# Patient Record
Sex: FEMALE | Race: WHITE | Employment: OTHER | URBAN - METROPOLITAN AREA
[De-identification: names, ages, dates, MRNs, and addresses within clinical notes are randomized per-mention and may not be internally consistent; named-entity substitution may affect disease eponyms.]

---

## 2017-07-14 ENCOUNTER — HOSPITAL ENCOUNTER (OUTPATIENT)
Dept: CT IMAGING | Age: 67
Discharge: HOME | End: 2017-07-14
Attending: ORTHOPAEDIC SURGERY
Payer: MEDICARE

## 2017-07-14 DIAGNOSIS — M25.561 RIGHT KNEE PAIN: ICD-10-CM

## 2017-07-14 PROCEDURE — 73700 CT LOWER EXTREMITY W/O DYE: CPT

## 2017-08-28 ENCOUNTER — HOSPITAL ENCOUNTER (OUTPATIENT)
Dept: PREADMISSION TESTING | Age: 67
Discharge: HOME | End: 2017-08-28
Payer: MEDICARE

## 2017-08-28 VITALS — BODY MASS INDEX: 23.95 KG/M2 | HEIGHT: 68 IN | WEIGHT: 158 LBS

## 2017-08-28 LAB
ANION GAP SERPL CALC-SCNC: 6 MMOL/L (ref 3–18)
APPEARANCE UR: CLEAR
ATRIAL RATE: 70 BPM
BACTERIA SPEC CULT: NORMAL
BILIRUB UR QL: NEGATIVE
BUN SERPL-MCNC: 13 MG/DL (ref 7–18)
BUN/CREAT SERPL: 19 (ref 12–20)
CALCIUM SERPL-MCNC: 9.1 MG/DL (ref 8.5–10.1)
CALCULATED P AXIS, ECG09: 69 DEGREES
CALCULATED R AXIS, ECG10: 59 DEGREES
CALCULATED T AXIS, ECG11: 67 DEGREES
CHLORIDE SERPL-SCNC: 103 MMOL/L (ref 100–108)
CO2 SERPL-SCNC: 31 MMOL/L (ref 21–32)
COLOR UR: YELLOW
CREAT SERPL-MCNC: 0.7 MG/DL (ref 0.6–1.3)
DIAGNOSIS, 93000: NORMAL
ERYTHROCYTE [DISTWIDTH] IN BLOOD BY AUTOMATED COUNT: 13 % (ref 11.6–14.5)
GLUCOSE SERPL-MCNC: 92 MG/DL (ref 74–99)
GLUCOSE UR STRIP.AUTO-MCNC: NEGATIVE MG/DL
HCT VFR BLD AUTO: 41 % (ref 35–45)
HGB BLD-MCNC: 14.1 G/DL (ref 12–16)
HGB UR QL STRIP: NEGATIVE
KETONES UR QL STRIP.AUTO: NEGATIVE MG/DL
LEUKOCYTE ESTERASE UR QL STRIP.AUTO: NEGATIVE
MCH RBC QN AUTO: 31.3 PG (ref 24–34)
MCHC RBC AUTO-ENTMCNC: 34.4 G/DL (ref 31–37)
MCV RBC AUTO: 91.1 FL (ref 74–97)
NITRITE UR QL STRIP.AUTO: NEGATIVE
P-R INTERVAL, ECG05: 162 MS
PH UR STRIP: 6.5 [PH] (ref 5–8)
PLATELET # BLD AUTO: 268 K/UL (ref 135–420)
PMV BLD AUTO: 9.6 FL (ref 9.2–11.8)
POTASSIUM SERPL-SCNC: 3.7 MMOL/L (ref 3.5–5.5)
PROT UR STRIP-MCNC: NEGATIVE MG/DL
Q-T INTERVAL, ECG07: 414 MS
QRS DURATION, ECG06: 88 MS
QTC CALCULATION (BEZET), ECG08: 447 MS
RBC # BLD AUTO: 4.5 M/UL (ref 4.2–5.3)
SERVICE CMNT-IMP: NORMAL
SODIUM SERPL-SCNC: 140 MMOL/L (ref 136–145)
SP GR UR REFRACTOMETRY: 1.02 (ref 1–1.03)
UROBILINOGEN UR QL STRIP.AUTO: 1 EU/DL (ref 0.2–1)
VENTRICULAR RATE, ECG03: 70 BPM
WBC # BLD AUTO: 7 K/UL (ref 4.6–13.2)

## 2017-08-28 PROCEDURE — 36415 COLL VENOUS BLD VENIPUNCTURE: CPT | Performed by: ORTHOPAEDIC SURGERY

## 2017-08-28 PROCEDURE — 81003 URINALYSIS AUTO W/O SCOPE: CPT | Performed by: ORTHOPAEDIC SURGERY

## 2017-08-28 PROCEDURE — 93005 ELECTROCARDIOGRAM TRACING: CPT

## 2017-08-28 PROCEDURE — 80048 BASIC METABOLIC PNL TOTAL CA: CPT | Performed by: ORTHOPAEDIC SURGERY

## 2017-08-28 PROCEDURE — 87086 URINE CULTURE/COLONY COUNT: CPT | Performed by: ORTHOPAEDIC SURGERY

## 2017-08-28 PROCEDURE — 87641 MR-STAPH DNA AMP PROBE: CPT | Performed by: ORTHOPAEDIC SURGERY

## 2017-08-28 PROCEDURE — 85027 COMPLETE CBC AUTOMATED: CPT | Performed by: ORTHOPAEDIC SURGERY

## 2017-08-28 RX ORDER — HYDROCHLOROTHIAZIDE 25 MG/1
25 TABLET ORAL DAILY
COMMUNITY

## 2017-08-28 RX ORDER — AMLODIPINE BESYLATE 5 MG/1
5 TABLET ORAL DAILY
COMMUNITY

## 2017-08-28 RX ORDER — LEVOTHYROXINE SODIUM 112 UG/1
112 TABLET ORAL
COMMUNITY

## 2017-08-28 RX ORDER — MELATONIN
1000 DAILY
COMMUNITY

## 2017-08-28 RX ORDER — GLUCOSAMINE/CHONDR SU A SOD 750-600 MG
1 TABLET ORAL 2 TIMES DAILY
COMMUNITY
End: 2017-09-14

## 2017-08-28 RX ORDER — CETIRIZINE HCL 10 MG
10 TABLET ORAL DAILY
COMMUNITY

## 2017-08-28 RX ORDER — NIACINAMIDE 500 MG
500 TABLET ORAL 2 TIMES DAILY
COMMUNITY

## 2017-08-28 RX ORDER — PRAVASTATIN SODIUM 40 MG/1
40 TABLET ORAL
COMMUNITY

## 2017-08-28 NOTE — PERIOP NOTES
Patient is allergic to PCN and Clindamycin.   Dr Anny Forte office called, left message on Victorina's voicemail re: alternative antibiotic

## 2017-08-30 LAB
BACTERIA SPEC CULT: NORMAL
SERVICE CMNT-IMP: NORMAL

## 2017-09-08 PROBLEM — E03.9 ACQUIRED HYPOTHYROIDISM: Chronic | Status: ACTIVE | Noted: 2017-09-08

## 2017-09-08 PROBLEM — I10 HTN (HYPERTENSION): Chronic | Status: ACTIVE | Noted: 2017-09-08

## 2017-09-08 PROBLEM — M17.11 PRIMARY OSTEOARTHRITIS OF RIGHT KNEE: Chronic | Status: ACTIVE | Noted: 2017-09-08

## 2017-09-08 PROBLEM — K21.9 GERD (GASTROESOPHAGEAL REFLUX DISEASE): Chronic | Status: ACTIVE | Noted: 2017-09-08

## 2017-09-08 PROBLEM — E78.00 HIGH CHOLESTEROL: Chronic | Status: ACTIVE | Noted: 2017-09-08

## 2017-09-08 NOTE — H&P
History and Physical        Patient: Arnie Zarate               Sex: female          DOA: (Not on file)         YOB: 1950      Age:  78 y.o.        LOS:  LOS: 0 days        HPI:     Marvel Burns is in for followup of her right severe tricompartmental knee DJD. The patient has not been in to see Dr. Shira Méndez in about a year. She has had two other cortisone injections by Dr. Vinita Kirk, but they have been less successful over the last two times. She is now to the point where it bothers her with all of her ADLs, and she wants to proceed with a right TKA. Dr. Shira Méndez talked to her about the things as we have in the past.    Standing AP, tunnel, lateral, and sunrise views of the right knee were obtained and interpreted in the office and show severe bicompartmental knee DJD that is bone-on-bone with mild valgus deformity. Otherwise, there is no periosteal reaction, no medullary lesions, no osteopenia, and no chondrolysis. Past Medical History:   Diagnosis Date    Arthritis     GERD (gastroesophageal reflux disease)     Hypertension     Thyroid disease        Past Surgical History:   Procedure Laterality Date    HX ACL RECONSTRUCTION Right     HX  SECTION      HX KNEE ARTHROSCOPY Right     x 3    HX LAP CHOLECYSTECTOMY      HX SHOULDER ARTHROSCOPY Right        No family history on file. Social History     Social History    Marital status:      Spouse name: N/A    Number of children: N/A    Years of education: N/A     Social History Main Topics    Smoking status: Former Smoker     Quit date: 1973    Smokeless tobacco: Never Used    Alcohol use Yes      Comment: 2-3 glasses wine weekly    Drug use: No    Sexual activity: Yes     Other Topics Concern    Not on file     Social History Narrative    No narrative on file       Prior to Admission medications    Medication Sig Start Date End Date Taking?  Authorizing Provider   amLODIPine (NORVASC) 5 mg tablet Take 5 mg by mouth daily. Historical Provider   hydroCHLOROthiazide (HYDRODIURIL) 25 mg tablet Take 25 mg by mouth daily. Historical Provider   pravastatin (PRAVACHOL) 40 mg tablet Take 40 mg by mouth daily (after dinner). Historical Provider   levothyroxine (SYNTHROID) 112 mcg tablet Take 112 mcg by mouth nightly. Historical Provider   cetirizine (ZYRTEC) 10 mg tablet Take 10 mg by mouth daily. Historical Provider   MV-MN/FOLIC ACID/CALCIUM/VIT K (ONE-A-DAY WOMEN'S 50 PLUS PO) Take 1 Tab by mouth daily. Historical Provider   CALCIUM CARBONATE/VITAMIN D3 (CALCIUM 600 + D,3, PO) Take 1 Tab by mouth daily. Historical Provider   cholecalciferol (VITAMIN D3) 1,000 unit tablet Take 1,000 Units by mouth daily. Historical Provider   GLUCOSAMINE/CHONDR TOUSSAINT A SOD (GLUCOSAMINE-CHONDROITIN) 750-600 mg tab Take 1 Tab by mouth two (2) times a day. Historical Provider   niacinamide 500 mg tablet Take 500 mg by mouth two (2) times a day. Historical Provider       Allergies   Allergen Reactions    Pcn [Penicillins] Hives, Rash, Itching and Swelling     Swelling of face and ears      Adhesive Tape-Silicones Rash and Itching    Clindamycin Hives, Rash and Itching       Review of Systems    Pertinent positives include joint pain. Pertinent negatives include weight change and loss of balance. Physical Exam:      There were no vitals taken for this visit. Physical Exam:  Physical exam shows a healthy-appearing, middle-aged white female. The right knee has about -10 to flexion of 130 degrees with mild genu valgum. She has positive patellofemoral crepitation and pain on the medial and lateral joint lines. Otherwise, examination of the right knee demonstrates the patient has a negative Lachman and has no varus instability at zero degrees or 30 degrees. There is a negative posterior sag. There is no knee effusion. There is no swelling, ecchymosis, or wounds. The patient has no no popliteal pain.   The patient has a negative patellar inhibition, a negative patellar grind, and a negative patellar apprehension test.  There is a negative Teofilo Maneuver. There is no pain at the inferior pole of the patella or the tibial tubercle. The patient has 5/5 muscle strength with good quadriceps tone. The patient has 2+ pedal pulses with normal motor strength of the foot and ankle and normal light-touch sensation in the foot and lower leg. Assessment/Plan     Principal Problem:    Primary osteoarthritis of right knee (9/8/2017)    Active Problems:    GERD (gastroesophageal reflux disease) (9/8/2017)      HTN (hypertension) (9/8/2017)      Acquired hypothyroidism (9/8/2017)      High cholesterol (9/8/2017)      Dr. Cyndi Rodriguez discussed with her a right TKA. We have ordered an MRI ahead of time for a ConforMIS TKA. He talked to her about the risks, the alternatives, and the benefits including infection, pain, and bleeding. We are going to do this in the middle of September so that she can be ready to go to her [facility] home in November. He told her we will use aspirin for postoperative DVT prophylaxis. She will be in the hospital overnight to Dr. Cyndi Rodriguez' home protocol after that. He talked to her about the importance of ice, elevation, range of motion, and postoperative physical therapy. He will see her again two weeks postop.

## 2017-09-12 ENCOUNTER — ANESTHESIA EVENT (OUTPATIENT)
Dept: SURGERY | Age: 67
DRG: 470 | End: 2017-09-12
Payer: MEDICARE

## 2017-09-12 RX ORDER — SODIUM CHLORIDE, SODIUM LACTATE, POTASSIUM CHLORIDE, CALCIUM CHLORIDE 600; 310; 30; 20 MG/100ML; MG/100ML; MG/100ML; MG/100ML
125 INJECTION, SOLUTION INTRAVENOUS CONTINUOUS
Status: CANCELLED | OUTPATIENT
Start: 2017-09-12 | End: 2017-09-13

## 2017-09-12 RX ORDER — CEFAZOLIN SODIUM 2 G/50ML
2 SOLUTION INTRAVENOUS EVERY 8 HOURS
Status: CANCELLED | OUTPATIENT
Start: 2017-09-12 | End: 2017-09-12

## 2017-09-12 RX ORDER — ONDANSETRON 4 MG/1
4 TABLET, ORALLY DISINTEGRATING ORAL
Status: CANCELLED | OUTPATIENT
Start: 2017-09-12

## 2017-09-12 RX ORDER — SODIUM CHLORIDE 0.9 % (FLUSH) 0.9 %
5-10 SYRINGE (ML) INJECTION EVERY 8 HOURS
Status: CANCELLED | OUTPATIENT
Start: 2017-09-12

## 2017-09-12 RX ORDER — SODIUM CHLORIDE 0.9 % (FLUSH) 0.9 %
5-10 SYRINGE (ML) INJECTION AS NEEDED
Status: CANCELLED | OUTPATIENT
Start: 2017-09-12

## 2017-09-12 RX ORDER — SODIUM CHLORIDE, SODIUM LACTATE, POTASSIUM CHLORIDE, CALCIUM CHLORIDE 600; 310; 30; 20 MG/100ML; MG/100ML; MG/100ML; MG/100ML
75 INJECTION, SOLUTION INTRAVENOUS CONTINUOUS
Status: CANCELLED | OUTPATIENT
Start: 2017-09-12 | End: 2017-09-13

## 2017-09-12 RX ORDER — CEFAZOLIN SODIUM 2 G/50ML
2 SOLUTION INTRAVENOUS ONCE
Status: CANCELLED | OUTPATIENT
Start: 2017-09-12 | End: 2017-09-12

## 2017-09-12 RX ORDER — DIPHENHYDRAMINE HCL 25 MG
25 CAPSULE ORAL
Status: CANCELLED | OUTPATIENT
Start: 2017-09-12

## 2017-09-12 RX ORDER — OXYCODONE HYDROCHLORIDE 5 MG/1
5-10 TABLET ORAL
Status: CANCELLED | OUTPATIENT
Start: 2017-09-12

## 2017-09-12 RX ORDER — ACETAMINOPHEN 325 MG/1
650 TABLET ORAL
Status: CANCELLED | OUTPATIENT
Start: 2017-09-12

## 2017-09-12 RX ORDER — NALOXONE HYDROCHLORIDE 0.4 MG/ML
0.4 INJECTION, SOLUTION INTRAMUSCULAR; INTRAVENOUS; SUBCUTANEOUS AS NEEDED
Status: CANCELLED | OUTPATIENT
Start: 2017-09-12

## 2017-09-12 RX ORDER — ZOLPIDEM TARTRATE 5 MG/1
5 TABLET ORAL
Status: CANCELLED | OUTPATIENT
Start: 2017-09-12

## 2017-09-12 RX ORDER — BISACODYL 5 MG
5 TABLET, DELAYED RELEASE (ENTERIC COATED) ORAL DAILY PRN
Status: CANCELLED | OUTPATIENT
Start: 2017-09-12

## 2017-09-13 ENCOUNTER — ANESTHESIA (OUTPATIENT)
Dept: SURGERY | Age: 67
DRG: 470 | End: 2017-09-13
Payer: MEDICARE

## 2017-09-13 ENCOUNTER — HOSPITAL ENCOUNTER (INPATIENT)
Age: 67
LOS: 1 days | Discharge: HOME HEALTH | DRG: 470 | End: 2017-09-14
Attending: ORTHOPAEDIC SURGERY | Admitting: ORTHOPAEDIC SURGERY
Payer: MEDICARE

## 2017-09-13 PROBLEM — M17.11 RIGHT KNEE DJD: Status: ACTIVE | Noted: 2017-09-13

## 2017-09-13 LAB
ABO + RH BLD: NORMAL
BLOOD GROUP ANTIBODIES SERPL: NORMAL
SPECIMEN EXP DATE BLD: NORMAL

## 2017-09-13 PROCEDURE — 77030006643: Performed by: SPECIALIST

## 2017-09-13 PROCEDURE — 77030038011: Performed by: ORTHOPAEDIC SURGERY

## 2017-09-13 PROCEDURE — L1830 KO IMMOB CANVAS LONG PRE OTS: HCPCS | Performed by: ORTHOPAEDIC SURGERY

## 2017-09-13 PROCEDURE — 74011250636 HC RX REV CODE- 250/636: Performed by: PHYSICIAN ASSISTANT

## 2017-09-13 PROCEDURE — 74011250636 HC RX REV CODE- 250/636: Performed by: SPECIALIST

## 2017-09-13 PROCEDURE — 76060000035 HC ANESTHESIA 2 TO 2.5 HR: Performed by: ORTHOPAEDIC SURGERY

## 2017-09-13 PROCEDURE — 74011250636 HC RX REV CODE- 250/636

## 2017-09-13 PROCEDURE — 77030016060 HC NDL NRV BLK TELE -A: Performed by: SPECIALIST

## 2017-09-13 PROCEDURE — 77030008477 HC STYL SATN SLP COVD -A: Performed by: SPECIALIST

## 2017-09-13 PROCEDURE — 76942 ECHO GUIDE FOR BIOPSY: CPT | Performed by: ORTHOPAEDIC SURGERY

## 2017-09-13 PROCEDURE — 36415 COLL VENOUS BLD VENIPUNCTURE: CPT | Performed by: ORTHOPAEDIC SURGERY

## 2017-09-13 PROCEDURE — 74011250637 HC RX REV CODE- 250/637: Performed by: SPECIALIST

## 2017-09-13 PROCEDURE — 74011000258 HC RX REV CODE- 258: Performed by: ORTHOPAEDIC SURGERY

## 2017-09-13 PROCEDURE — 76210000016 HC OR PH I REC 1 TO 1.5 HR: Performed by: ORTHOPAEDIC SURGERY

## 2017-09-13 PROCEDURE — C9290 INJ, BUPIVACAINE LIPOSOME: HCPCS | Performed by: ORTHOPAEDIC SURGERY

## 2017-09-13 PROCEDURE — 77030018836 HC SOL IRR NACL ICUM -A: Performed by: ORTHOPAEDIC SURGERY

## 2017-09-13 PROCEDURE — 77030008683 HC TU ET CUF COVD -A: Performed by: SPECIALIST

## 2017-09-13 PROCEDURE — 77030002934 HC SUT MCRYL J&J -B: Performed by: ORTHOPAEDIC SURGERY

## 2017-09-13 PROCEDURE — 74011250637 HC RX REV CODE- 250/637: Performed by: PHYSICIAN ASSISTANT

## 2017-09-13 PROCEDURE — 65270000029 HC RM PRIVATE

## 2017-09-13 PROCEDURE — 74011000250 HC RX REV CODE- 250: Performed by: SPECIALIST

## 2017-09-13 PROCEDURE — 77030011640 HC PAD GRND REM COVD -A: Performed by: ORTHOPAEDIC SURGERY

## 2017-09-13 PROCEDURE — 77030036563 HC WRP CLD THER KNE S2SG -B: Performed by: ORTHOPAEDIC SURGERY

## 2017-09-13 PROCEDURE — 74011000250 HC RX REV CODE- 250

## 2017-09-13 PROCEDURE — 77030034479 HC ADH SKN CLSR PRINEO J&J -B: Performed by: ORTHOPAEDIC SURGERY

## 2017-09-13 PROCEDURE — 77030027138 HC INCENT SPIROMETER -A: Performed by: ORTHOPAEDIC SURGERY

## 2017-09-13 PROCEDURE — 77030018846 HC SOL IRR STRL H20 ICUM -A: Performed by: ORTHOPAEDIC SURGERY

## 2017-09-13 PROCEDURE — 77030037875 HC DRSG MEPILEX <16IN BORD MOLN -A: Performed by: ORTHOPAEDIC SURGERY

## 2017-09-13 PROCEDURE — 77030018822 HC SLV COMPR FT COVD -B: Performed by: ORTHOPAEDIC SURGERY

## 2017-09-13 PROCEDURE — 64415 NJX AA&/STRD BRCH PLXS IMG: CPT | Performed by: SPECIALIST

## 2017-09-13 PROCEDURE — 0SRC0J9 REPLACEMENT OF RIGHT KNEE JOINT WITH SYNTHETIC SUBSTITUTE, CEMENTED, OPEN APPROACH: ICD-10-PCS | Performed by: ORTHOPAEDIC SURGERY

## 2017-09-13 PROCEDURE — 86900 BLOOD TYPING SEROLOGIC ABO: CPT | Performed by: ORTHOPAEDIC SURGERY

## 2017-09-13 PROCEDURE — 74011000250 HC RX REV CODE- 250: Performed by: ORTHOPAEDIC SURGERY

## 2017-09-13 PROCEDURE — 77030031139 HC SUT VCRL2 J&J -A: Performed by: ORTHOPAEDIC SURGERY

## 2017-09-13 PROCEDURE — C1713 ANCHOR/SCREW BN/BN,TIS/BN: HCPCS | Performed by: ORTHOPAEDIC SURGERY

## 2017-09-13 PROCEDURE — C1776 JOINT DEVICE (IMPLANTABLE): HCPCS | Performed by: ORTHOPAEDIC SURGERY

## 2017-09-13 PROCEDURE — 97161 PT EVAL LOW COMPLEX 20 MIN: CPT

## 2017-09-13 PROCEDURE — 74011250636 HC RX REV CODE- 250/636: Performed by: ORTHOPAEDIC SURGERY

## 2017-09-13 PROCEDURE — 77030003666 HC NDL SPINAL BD -A: Performed by: ORTHOPAEDIC SURGERY

## 2017-09-13 PROCEDURE — 77030020782 HC GWN BAIR PAWS FLX 3M -B: Performed by: ORTHOPAEDIC SURGERY

## 2017-09-13 PROCEDURE — 77030038010: Performed by: ORTHOPAEDIC SURGERY

## 2017-09-13 PROCEDURE — 77030013708 HC HNDPC SUC IRR PULS STRY –B: Performed by: ORTHOPAEDIC SURGERY

## 2017-09-13 PROCEDURE — 77030020754 HC CUF TRNQT 2BLA STRY -B: Performed by: ORTHOPAEDIC SURGERY

## 2017-09-13 PROCEDURE — 97116 GAIT TRAINING THERAPY: CPT

## 2017-09-13 PROCEDURE — 76010000131 HC OR TIME 2 TO 2.5 HR: Performed by: ORTHOPAEDIC SURGERY

## 2017-09-13 DEVICE — CEMENT BNE 20GM HALF DOSE PMMA VISC RADPQ FAST: Type: IMPLANTABLE DEVICE | Site: KNEE | Status: FUNCTIONAL

## 2017-09-13 DEVICE — IMPLANTABLE DEVICE: Type: IMPLANTABLE DEVICE | Site: KNEE | Status: FUNCTIONAL

## 2017-09-13 RX ORDER — ACETAMINOPHEN 10 MG/ML
1000 INJECTION, SOLUTION INTRAVENOUS
Status: COMPLETED | OUTPATIENT
Start: 2017-09-13 | End: 2017-09-13

## 2017-09-13 RX ORDER — SODIUM CHLORIDE, SODIUM LACTATE, POTASSIUM CHLORIDE, CALCIUM CHLORIDE 600; 310; 30; 20 MG/100ML; MG/100ML; MG/100ML; MG/100ML
125 INJECTION, SOLUTION INTRAVENOUS CONTINUOUS
Status: DISCONTINUED | OUTPATIENT
Start: 2017-09-13 | End: 2017-09-13 | Stop reason: HOSPADM

## 2017-09-13 RX ORDER — CELECOXIB 100 MG/1
200 CAPSULE ORAL ONCE
Status: COMPLETED | OUTPATIENT
Start: 2017-09-13 | End: 2017-09-13

## 2017-09-13 RX ORDER — HYDROCHLOROTHIAZIDE 25 MG/1
25 TABLET ORAL DAILY
Status: DISCONTINUED | OUTPATIENT
Start: 2017-09-14 | End: 2017-09-14 | Stop reason: HOSPADM

## 2017-09-13 RX ORDER — PREGABALIN 25 MG/1
25 CAPSULE ORAL ONCE
Status: COMPLETED | OUTPATIENT
Start: 2017-09-13 | End: 2017-09-13

## 2017-09-13 RX ORDER — ROPIVACAINE HYDROCHLORIDE 5 MG/ML
INJECTION, SOLUTION EPIDURAL; INFILTRATION; PERINEURAL AS NEEDED
Status: DISCONTINUED | OUTPATIENT
Start: 2017-09-13 | End: 2017-09-13 | Stop reason: HOSPADM

## 2017-09-13 RX ORDER — PRAVASTATIN SODIUM 20 MG/1
40 TABLET ORAL
Status: DISCONTINUED | OUTPATIENT
Start: 2017-09-13 | End: 2017-09-14 | Stop reason: HOSPADM

## 2017-09-13 RX ORDER — OMEPRAZOLE 40 MG/1
40 CAPSULE, DELAYED RELEASE ORAL
COMMUNITY

## 2017-09-13 RX ORDER — ROCURONIUM BROMIDE 10 MG/ML
INJECTION, SOLUTION INTRAVENOUS AS NEEDED
Status: DISCONTINUED | OUTPATIENT
Start: 2017-09-13 | End: 2017-09-13 | Stop reason: HOSPADM

## 2017-09-13 RX ORDER — KETAMINE HYDROCHLORIDE 10 MG/ML
INJECTION, SOLUTION INTRAMUSCULAR; INTRAVENOUS AS NEEDED
Status: DISCONTINUED | OUTPATIENT
Start: 2017-09-13 | End: 2017-09-13 | Stop reason: HOSPADM

## 2017-09-13 RX ORDER — AMLODIPINE BESYLATE 5 MG/1
5 TABLET ORAL DAILY
Status: DISCONTINUED | OUTPATIENT
Start: 2017-09-14 | End: 2017-09-14 | Stop reason: HOSPADM

## 2017-09-13 RX ORDER — MELATONIN
1000 DAILY
Status: DISCONTINUED | OUTPATIENT
Start: 2017-09-14 | End: 2017-09-14 | Stop reason: HOSPADM

## 2017-09-13 RX ORDER — DIPHENHYDRAMINE HCL 25 MG
25 CAPSULE ORAL
Status: DISCONTINUED | OUTPATIENT
Start: 2017-09-13 | End: 2017-09-14 | Stop reason: HOSPADM

## 2017-09-13 RX ORDER — ONDANSETRON 2 MG/ML
INJECTION INTRAMUSCULAR; INTRAVENOUS AS NEEDED
Status: DISCONTINUED | OUTPATIENT
Start: 2017-09-13 | End: 2017-09-13 | Stop reason: HOSPADM

## 2017-09-13 RX ORDER — VANCOMYCIN HYDROCHLORIDE
1250 EVERY 12 HOURS
Status: COMPLETED | OUTPATIENT
Start: 2017-09-14 | End: 2017-09-14

## 2017-09-13 RX ORDER — BISACODYL 5 MG
5 TABLET, DELAYED RELEASE (ENTERIC COATED) ORAL DAILY PRN
Status: DISCONTINUED | OUTPATIENT
Start: 2017-09-13 | End: 2017-09-14 | Stop reason: HOSPADM

## 2017-09-13 RX ORDER — FAMOTIDINE 10 MG/ML
20 INJECTION INTRAVENOUS ONCE
Status: COMPLETED | OUTPATIENT
Start: 2017-09-13 | End: 2017-09-13

## 2017-09-13 RX ORDER — LORATADINE 10 MG/1
10 TABLET ORAL DAILY
Status: DISCONTINUED | OUTPATIENT
Start: 2017-09-14 | End: 2017-09-14 | Stop reason: HOSPADM

## 2017-09-13 RX ORDER — NALOXONE HYDROCHLORIDE 0.4 MG/ML
0.4 INJECTION, SOLUTION INTRAMUSCULAR; INTRAVENOUS; SUBCUTANEOUS AS NEEDED
Status: DISCONTINUED | OUTPATIENT
Start: 2017-09-13 | End: 2017-09-14 | Stop reason: HOSPADM

## 2017-09-13 RX ORDER — SODIUM CHLORIDE 0.9 % (FLUSH) 0.9 %
5-10 SYRINGE (ML) INJECTION AS NEEDED
Status: DISCONTINUED | OUTPATIENT
Start: 2017-09-13 | End: 2017-09-14 | Stop reason: HOSPADM

## 2017-09-13 RX ORDER — VANCOMYCIN HYDROCHLORIDE
1250 ONCE
Status: COMPLETED | OUTPATIENT
Start: 2017-09-13 | End: 2017-09-13

## 2017-09-13 RX ORDER — OXYCODONE HYDROCHLORIDE 5 MG/1
5 TABLET ORAL ONCE
Status: COMPLETED | OUTPATIENT
Start: 2017-09-13 | End: 2017-09-13

## 2017-09-13 RX ORDER — ACETAMINOPHEN 325 MG/1
650 TABLET ORAL
Status: DISCONTINUED | OUTPATIENT
Start: 2017-09-13 | End: 2017-09-14 | Stop reason: HOSPADM

## 2017-09-13 RX ORDER — GLYCOPYRROLATE 0.2 MG/ML
INJECTION INTRAMUSCULAR; INTRAVENOUS AS NEEDED
Status: DISCONTINUED | OUTPATIENT
Start: 2017-09-13 | End: 2017-09-13 | Stop reason: HOSPADM

## 2017-09-13 RX ORDER — SODIUM CHLORIDE, SODIUM LACTATE, POTASSIUM CHLORIDE, CALCIUM CHLORIDE 600; 310; 30; 20 MG/100ML; MG/100ML; MG/100ML; MG/100ML
75 INJECTION, SOLUTION INTRAVENOUS CONTINUOUS
Status: DISCONTINUED | OUTPATIENT
Start: 2017-09-13 | End: 2017-09-14 | Stop reason: HOSPADM

## 2017-09-13 RX ORDER — ASPIRIN 325 MG
325 TABLET ORAL 2 TIMES DAILY
Status: DISCONTINUED | OUTPATIENT
Start: 2017-09-13 | End: 2017-09-14 | Stop reason: HOSPADM

## 2017-09-13 RX ORDER — METOCLOPRAMIDE HYDROCHLORIDE 5 MG/ML
INJECTION INTRAMUSCULAR; INTRAVENOUS AS NEEDED
Status: DISCONTINUED | OUTPATIENT
Start: 2017-09-13 | End: 2017-09-13 | Stop reason: HOSPADM

## 2017-09-13 RX ORDER — LIDOCAINE HYDROCHLORIDE AND EPINEPHRINE 20; 5 MG/ML; UG/ML
INJECTION, SOLUTION EPIDURAL; INFILTRATION; INTRACAUDAL; PERINEURAL AS NEEDED
Status: DISCONTINUED | OUTPATIENT
Start: 2017-09-13 | End: 2017-09-13 | Stop reason: HOSPADM

## 2017-09-13 RX ORDER — OMEPRAZOLE 20 MG/1
40 CAPSULE, DELAYED RELEASE ORAL
Status: DISCONTINUED | OUTPATIENT
Start: 2017-09-13 | End: 2017-09-14 | Stop reason: HOSPADM

## 2017-09-13 RX ORDER — FENTANYL CITRATE 50 UG/ML
INJECTION, SOLUTION INTRAMUSCULAR; INTRAVENOUS AS NEEDED
Status: DISCONTINUED | OUTPATIENT
Start: 2017-09-13 | End: 2017-09-13 | Stop reason: HOSPADM

## 2017-09-13 RX ORDER — OXYCODONE HYDROCHLORIDE 5 MG/1
5-10 TABLET ORAL
Status: DISCONTINUED | OUTPATIENT
Start: 2017-09-13 | End: 2017-09-14 | Stop reason: HOSPADM

## 2017-09-13 RX ORDER — SODIUM CHLORIDE 0.9 % (FLUSH) 0.9 %
5-10 SYRINGE (ML) INJECTION EVERY 8 HOURS
Status: DISCONTINUED | OUTPATIENT
Start: 2017-09-13 | End: 2017-09-14 | Stop reason: HOSPADM

## 2017-09-13 RX ORDER — LEVOTHYROXINE SODIUM 112 UG/1
112 TABLET ORAL
Status: DISCONTINUED | OUTPATIENT
Start: 2017-09-13 | End: 2017-09-14 | Stop reason: HOSPADM

## 2017-09-13 RX ORDER — PROPOFOL 10 MG/ML
INJECTION, EMULSION INTRAVENOUS AS NEEDED
Status: DISCONTINUED | OUTPATIENT
Start: 2017-09-13 | End: 2017-09-13 | Stop reason: HOSPADM

## 2017-09-13 RX ORDER — MIDAZOLAM HYDROCHLORIDE 1 MG/ML
INJECTION, SOLUTION INTRAMUSCULAR; INTRAVENOUS AS NEEDED
Status: DISCONTINUED | OUTPATIENT
Start: 2017-09-13 | End: 2017-09-13 | Stop reason: HOSPADM

## 2017-09-13 RX ORDER — HYDROMORPHONE HYDROCHLORIDE 1 MG/ML
0.5 INJECTION, SOLUTION INTRAMUSCULAR; INTRAVENOUS; SUBCUTANEOUS
Status: DISCONTINUED | OUTPATIENT
Start: 2017-09-13 | End: 2017-09-13 | Stop reason: HOSPADM

## 2017-09-13 RX ORDER — FERROUS SULFATE, DRIED 160(50) MG
1 TABLET, EXTENDED RELEASE ORAL
Status: DISCONTINUED | OUTPATIENT
Start: 2017-09-14 | End: 2017-09-14 | Stop reason: HOSPADM

## 2017-09-13 RX ORDER — ZOLPIDEM TARTRATE 5 MG/1
5 TABLET ORAL
Status: DISCONTINUED | OUTPATIENT
Start: 2017-09-13 | End: 2017-09-14 | Stop reason: HOSPADM

## 2017-09-13 RX ORDER — ONDANSETRON 4 MG/1
4 TABLET, ORALLY DISINTEGRATING ORAL
Status: DISCONTINUED | OUTPATIENT
Start: 2017-09-13 | End: 2017-09-14 | Stop reason: HOSPADM

## 2017-09-13 RX ORDER — LIDOCAINE HYDROCHLORIDE 20 MG/ML
INJECTION, SOLUTION EPIDURAL; INFILTRATION; INTRACAUDAL; PERINEURAL AS NEEDED
Status: DISCONTINUED | OUTPATIENT
Start: 2017-09-13 | End: 2017-09-13 | Stop reason: HOSPADM

## 2017-09-13 RX ORDER — NIACINAMIDE 500 MG
500 TABLET ORAL 2 TIMES DAILY
Status: DISCONTINUED | OUTPATIENT
Start: 2017-09-13 | End: 2017-09-14 | Stop reason: HOSPADM

## 2017-09-13 RX ADMIN — ROCURONIUM BROMIDE 50 MG: 10 INJECTION, SOLUTION INTRAVENOUS at 12:08

## 2017-09-13 RX ADMIN — HYDROMORPHONE HYDROCHLORIDE 0.5 MG: 1 INJECTION, SOLUTION INTRAMUSCULAR; INTRAVENOUS; SUBCUTANEOUS at 15:15

## 2017-09-13 RX ADMIN — PROPOFOL 20 MG: 10 INJECTION, EMULSION INTRAVENOUS at 13:49

## 2017-09-13 RX ADMIN — PRAVASTATIN SODIUM 40 MG: 20 TABLET ORAL at 18:29

## 2017-09-13 RX ADMIN — HYDROMORPHONE HYDROCHLORIDE 0.5 MG: 1 INJECTION, SOLUTION INTRAMUSCULAR; INTRAVENOUS; SUBCUTANEOUS at 15:02

## 2017-09-13 RX ADMIN — FENTANYL CITRATE 25 MCG: 50 INJECTION, SOLUTION INTRAMUSCULAR; INTRAVENOUS at 14:04

## 2017-09-13 RX ADMIN — SODIUM CHLORIDE, SODIUM LACTATE, POTASSIUM CHLORIDE, AND CALCIUM CHLORIDE: 600; 310; 30; 20 INJECTION, SOLUTION INTRAVENOUS at 12:30

## 2017-09-13 RX ADMIN — PROPOFOL 20 MG: 10 INJECTION, EMULSION INTRAVENOUS at 13:52

## 2017-09-13 RX ADMIN — KETAMINE HYDROCHLORIDE 10 MG: 10 INJECTION, SOLUTION INTRAMUSCULAR; INTRAVENOUS at 12:05

## 2017-09-13 RX ADMIN — SODIUM CHLORIDE, SODIUM LACTATE, POTASSIUM CHLORIDE, AND CALCIUM CHLORIDE: 600; 310; 30; 20 INJECTION, SOLUTION INTRAVENOUS at 13:00

## 2017-09-13 RX ADMIN — ACETAMINOPHEN 1000 MG: 10 INJECTION, SOLUTION INTRAVENOUS at 14:49

## 2017-09-13 RX ADMIN — KETAMINE HYDROCHLORIDE 10 MG: 10 INJECTION, SOLUTION INTRAMUSCULAR; INTRAVENOUS at 11:30

## 2017-09-13 RX ADMIN — MIDAZOLAM HYDROCHLORIDE 1.5 MG: 1 INJECTION, SOLUTION INTRAMUSCULAR; INTRAVENOUS at 11:30

## 2017-09-13 RX ADMIN — KETAMINE HYDROCHLORIDE 10 MG: 10 INJECTION, SOLUTION INTRAMUSCULAR; INTRAVENOUS at 11:56

## 2017-09-13 RX ADMIN — FENTANYL CITRATE 50 MCG: 50 INJECTION, SOLUTION INTRAMUSCULAR; INTRAVENOUS at 12:05

## 2017-09-13 RX ADMIN — OXYCODONE HYDROCHLORIDE 5 MG: 5 TABLET ORAL at 23:36

## 2017-09-13 RX ADMIN — LEVOTHYROXINE SODIUM 112 MCG: 112 TABLET ORAL at 22:55

## 2017-09-13 RX ADMIN — LIDOCAINE HYDROCHLORIDE 100 MG: 20 INJECTION, SOLUTION EPIDURAL; INFILTRATION; INTRACAUDAL; PERINEURAL at 12:02

## 2017-09-13 RX ADMIN — FENTANYL CITRATE 25 MCG: 50 INJECTION, SOLUTION INTRAMUSCULAR; INTRAVENOUS at 14:10

## 2017-09-13 RX ADMIN — ROPIVACAINE HYDROCHLORIDE 10 ML: 5 INJECTION, SOLUTION EPIDURAL; INFILTRATION; PERINEURAL at 11:34

## 2017-09-13 RX ADMIN — SODIUM CHLORIDE, SODIUM LACTATE, POTASSIUM CHLORIDE, AND CALCIUM CHLORIDE 125 ML/HR: 600; 310; 30; 20 INJECTION, SOLUTION INTRAVENOUS at 14:52

## 2017-09-13 RX ADMIN — PROPOFOL 120 MG: 10 INJECTION, EMULSION INTRAVENOUS at 12:08

## 2017-09-13 RX ADMIN — FENTANYL CITRATE 25 MCG: 50 INJECTION, SOLUTION INTRAMUSCULAR; INTRAVENOUS at 12:15

## 2017-09-13 RX ADMIN — GLYCOPYRROLATE 0.1 MG: 0.2 INJECTION INTRAMUSCULAR; INTRAVENOUS at 11:56

## 2017-09-13 RX ADMIN — PREGABALIN 25 MG: 25 CAPSULE ORAL at 11:38

## 2017-09-13 RX ADMIN — FENTANYL CITRATE 25 MCG: 50 INJECTION, SOLUTION INTRAMUSCULAR; INTRAVENOUS at 12:02

## 2017-09-13 RX ADMIN — MIDAZOLAM HYDROCHLORIDE 2 MG: 1 INJECTION, SOLUTION INTRAMUSCULAR; INTRAVENOUS at 11:56

## 2017-09-13 RX ADMIN — PROPOFOL 20 MG: 10 INJECTION, EMULSION INTRAVENOUS at 13:55

## 2017-09-13 RX ADMIN — GLYCOPYRROLATE 0.1 MG: 0.2 INJECTION INTRAMUSCULAR; INTRAVENOUS at 12:02

## 2017-09-13 RX ADMIN — VANCOMYCIN HYDROCHLORIDE 1250 MG: 10 INJECTION, POWDER, LYOPHILIZED, FOR SOLUTION INTRAVENOUS at 11:43

## 2017-09-13 RX ADMIN — METOCLOPRAMIDE HYDROCHLORIDE 10 MG: 5 INJECTION INTRAMUSCULAR; INTRAVENOUS at 11:56

## 2017-09-13 RX ADMIN — FENTANYL CITRATE 25 MCG: 50 INJECTION, SOLUTION INTRAMUSCULAR; INTRAVENOUS at 12:10

## 2017-09-13 RX ADMIN — LIDOCAINE HYDROCHLORIDE AND EPINEPHRINE 4 ML: 20; 5 INJECTION, SOLUTION EPIDURAL; INFILTRATION; INTRACAUDAL; PERINEURAL at 11:32

## 2017-09-13 RX ADMIN — KETAMINE HYDROCHLORIDE 10 MG: 10 INJECTION, SOLUTION INTRAMUSCULAR; INTRAVENOUS at 12:02

## 2017-09-13 RX ADMIN — OXYCODONE HYDROCHLORIDE 5 MG: 5 TABLET ORAL at 11:38

## 2017-09-13 RX ADMIN — ONDANSETRON 4 MG: 2 INJECTION INTRAMUSCULAR; INTRAVENOUS at 13:45

## 2017-09-13 RX ADMIN — VANCOMYCIN HYDROCHLORIDE 1.25 G: 10 INJECTION, POWDER, LYOPHILIZED, FOR SOLUTION INTRAVENOUS at 11:40

## 2017-09-13 RX ADMIN — CELECOXIB 200 MG: 100 CAPSULE ORAL at 11:38

## 2017-09-13 RX ADMIN — FENTANYL CITRATE 25 MCG: 50 INJECTION, SOLUTION INTRAMUSCULAR; INTRAVENOUS at 11:56

## 2017-09-13 RX ADMIN — FAMOTIDINE 20 MG: 10 INJECTION, SOLUTION INTRAVENOUS at 11:40

## 2017-09-13 RX ADMIN — DIPHENHYDRAMINE HYDROCHLORIDE 25 MG: 25 CAPSULE ORAL at 22:55

## 2017-09-13 RX ADMIN — ASPIRIN 325 MG ORAL TABLET 325 MG: 325 PILL ORAL at 21:21

## 2017-09-13 RX ADMIN — SODIUM CHLORIDE, SODIUM LACTATE, POTASSIUM CHLORIDE, AND CALCIUM CHLORIDE 125 ML/HR: 600; 310; 30; 20 INJECTION, SOLUTION INTRAVENOUS at 11:15

## 2017-09-13 RX ADMIN — PROPOFOL 20 MG: 10 INJECTION, EMULSION INTRAVENOUS at 13:44

## 2017-09-13 NOTE — INTERVAL H&P NOTE
H&P Update:  Danie Whitney was seen and examined. History and physical has been reviewed. The patient has been examined. There have been no significant clinical changes since the completion of the originally dated History and Physical.  Patient identified by surgeon; surgical site was confirmed by patient and surgeon.     Signed By: Devika Estes MD     September 13, 2017 10:58 AM

## 2017-09-13 NOTE — PROGRESS NOTES
Problem: Mobility Impaired (Adult and Pediatric)  Goal: *Acute Goals and Plan of Care (Insert Text)  In 1-7 days pt will be able to perform:  ST. Bed mobility: Rolling L to R to L modified independent for positioning. 2. Supine to sit to supine S with HR for meals. 3. Sit to stand to sit S with RW in prep for ambulation. LT. Gait: Ambulate >150ft S with RW, WBAT, for home/community mobility. 2. Stair Negotiation: Ascend/descend >3 steps CGA with HR for home entry. 3. Activity tolerance: Tolerate up in chair 1-2 hours for ADLs. 4. Patient/Family Education: Patient/family to be independent with HEP for follow-up care and safe discharge. PHYSICAL THERAPY EVALUATION     Patient: Osbaldo Jason (26 y.o. female)  Date: 2017  Primary Diagnosis: OSTEOARTHRITIS RIGHT KNEE  Right knee DJD  Procedure(s) (LRB):  RIGHT TOTAL KNEE ARTHROPLASTY (CONFORMIS) (Right) Day of Surgery   Precautions:   Fall, WBAT      ASSESSMENT :  Based on the objective data described below, the patient presents with decreased functional mobility and independence in regard to bed mobility, transfers, gt quality and tolerance, R knee 5-50 degrees AROM, R knee strength, pain, stair negotiation and safety due to recent R TKA surgery. Pt denies pain at this time. Pt able to participate in gt training w/ RW, WBAT, KI, GB and CGA/min A w/ antalgic pattern. Pt c/o feeling light headed and nauseated. Pt returned to supine in bed w/ all needs within reach, SCDs applied and ice pack to R knee. Nurse Gin Vargas aware. Recommend [facility] upon hospital d/c. Patient will benefit from skilled intervention to address the above impairments.   Patients rehabilitation potential is considered to be Good  Factors which may influence rehabilitation potential include:   [ ]         None noted  [ ]         Mental ability/status  [ ]         Medical condition  [ ]         Home/family situation and support systems  [ ]         Safety awareness  [X] Pain tolerance/management  [ ]         Other:        PLAN :  Recommendations and Planned Interventions:  [X]           Bed Mobility Training             [ ]    Neuromuscular Re-Education  [X]           Transfer Training                   [ ]    Orthotic/Prosthetic Training  [X]           Gait Training                          [ ]    Modalities  [X]           Therapeutic Exercises          [ ]    Edema Management/Control  [X]           Therapeutic Activities            [X]    Patient and Family Training/Education  [ ]           Other (comment):     Frequency/Duration: Patient will be followed by physical therapy twice daily to address goals. Discharge Recommendations: Home Health  Further Equipment Recommendations for Discharge: N/A       SUBJECTIVE:   Patient stated I just feel out of it.       OBJECTIVE DATA SUMMARY:       Past Medical History:   Diagnosis Date    Arthritis      GERD (gastroesophageal reflux disease)      Hypertension      Thyroid disease       Past Surgical History:   Procedure Laterality Date    HX ACL RECONSTRUCTION Right      HX  SECTION        HX KNEE ARTHROSCOPY Right       x 3    HX LAP CHOLECYSTECTOMY        HX SHOULDER ARTHROSCOPY Right       Barriers to Learning/Limitations: None  Compensate with: visual, verbal, tactile, kinesthetic cues/model  Prior Level of Function/Home Situation:   Home Situation  Home Environment: Private residence  # Steps to Enter: 5  Rails to Enter: Yes  Hand Rails : Bilateral  One/Two Story Residence: Two story, live on 1st floor  # of Interior Steps: 13  Living Alone: No  Support Systems: Spouse/Significant Other/Partner  Patient Expects to be Discharged to[de-identified] Private residence  Current DME Used/Available at Home: Raised toilet seat, Walker, rolling  Critical Behavior:  Neurologic State: Alert; Appropriate for age  Orientation Level: Oriented X4  Cognition: Appropriate decision making; Appropriate for age attention/concentration; Appropriate safety awareness; Follows commands  Safety/Judgement: Awareness of environment  Psychosocial  Patient Behaviors: Calm; Cooperative  Purposeful Interaction: Yes  Pt Identified Daily Priority: Clinical issues (comment)  Caring Interventions: Reassure  Reassure: Therapeutic listening; Informing; Acceptance;Caring rounds  Skin Condition/Temp: Dry;Warm  Skin Integrity: Incision (comment) (R knee)  Skin Integumentary  Skin Color: Appropriate for ethnicity  Skin Condition/Temp: Dry;Warm  Skin Integrity: Incision (comment) (R knee)  Turgor: Non-tenting  Hair Growth: Present  Varicosities: Absent  Strength:    Strength: Generally decreased, functional  Tone & Sensation:   Tone: Normal  Sensation: Intact  Range Of Motion:  AROM: Generally decreased, functional (5-50 degrees R knee)  Functional Mobility:  Bed Mobility:  Supine to Sit: Contact guard assistance (vc)  Sit to Supine: Contact guard assistance (vc)  Scooting: Contact guard assistance (vc)  Transfers:  Sit to Stand: Minimum assistance (vc)  Stand to Sit: Contact guard assistance (vc)  Balance:   Sitting: Intact  Standing: Intact; With support  Ambulation/Gait Training:  Distance (ft): 34 Feet (ft)  Assistive Device: Walker, rolling;Gait belt;Brace/Splint  Ambulation - Level of Assistance: Contact guard assistance;Minimal assistance (vc)  Gait Abnormalities: Antalgic;Decreased step clearance; Step to gait  Right Side Weight Bearing: As tolerated  Base of Support: Shift to left  Stance: Right decreased  Speed/Laura: Slow  Step Length: Left shortened;Right shortened  Swing Pattern: Left asymmetrical;Right asymmetrical  Interventions: Manual cues; Safety awareness training;Verbal cues; Tactile cues  Therapeutic Exercises:   HEP written copy issued to pt per MD protocol.   Pain:  Pain Scale 1: Numeric (0 - 10)  Pain Intensity 1: 2  Pain Location 1: Knee  Pain Orientation 1: Posterior  Pain Description 1: Aching  Pain Intervention(s) 1: Medication (see MAR)  Activity Tolerance: Fair   Please refer to the flowsheet for vital signs taken during this treatment. After treatment:   [ ]         Patient left in no apparent distress sitting up in chair  [X]         Patient left in no apparent distress in bed  [X]         Call bell left within reach  [X]         Nursing notified  [ ]         Caregiver present  [ ]         Bed alarm activated      COMMUNICATION/EDUCATION:   [X]         Fall prevention education was provided and the patient/caregiver indicated understanding. [X]         Patient/family have participated as able in goal setting and plan of care. [X]         Patient/family agree to work toward stated goals and plan of care. [ ]         Patient understands intent and goals of therapy, but is neutral about his/her participation. [ ]         Patient is unable to participate in goal setting and plan of care.      Thank you for this referral.  Batool Villagran, PT   Time Calculation: 25 mins  Eval Complexity: History: MEDIUM  Complexity : 1-2 comorbidities / personal factors will impact the outcome/ POC Exam:MEDIUM Complexity : 3 Standardized tests and measures addressing body structure, function, activity limitation and / or participation in recreation  Presentation: LOW Complexity : Stable, uncomplicated  Clinical Decision Making:Low Complexity amb >30' Overall Complexity:LOW

## 2017-09-13 NOTE — ANESTHESIA POSTPROCEDURE EVALUATION
Post-Anesthesia Evaluation & Assessment    Visit Vitals    /65    Pulse 86    Temp 36.2 °C (97.1 °F)    Resp 20    Ht 5' 7.5\" (1.715 m)    Wt 71.3 kg (157 lb 4 oz)    SpO2 100%    BMI 24.27 kg/m2       No untreated/active PONV    Post-operative hydration adequate. Adequate post-operative analgesia per PACU discharge criteria    Mental status & level of consciousness: alert and oriented x 3    Respiratory status: patent unassisted airway     No apparent anesthetic complications requiring additional post-anesthetic care    Patient has met all discharge requirements.             Francis Hebert MD

## 2017-09-13 NOTE — IP AVS SNAPSHOT
[address] [place] [address] 
[phone] Patient: Dmitri Staton MRN: [id number]:[id number] You are allergic to the following Allergen Reactions Pcn (Penicillins) Hives Rash Itching Swelling Swelling of face and ears Adhesive Tape-Silicones Rash Itching Clindamycin Hives Rash Itching Hydrocodone Itching Singulair (Montelukast) Rash Itching Recent Documentation Height Weight BMI OB Status Smoking Status 1.715 m 71.3 kg 24.27 kg/m2 Postmenopausal Former Smoker Emergency Contacts Name Discharge Info Relation Home Work Mobile 2011 [facility] CAREGIVER [3] Spouse [3]   [phone] About your hospitalization You were admitted on:  September 13, 2017 You last received care in the:  [facility] [address] You were discharged on:  September 14, 2017 Unit phone number:  [phone] Why you were hospitalized Your primary diagnosis was:  Primary Osteoarthritis Of Right Knee Your diagnoses also included:  Gerd (Gastroesophageal Reflux Disease), Htn (Hypertension), Acquired Hypothyroidism, High Cholesterol, Right Knee Djd Providers Seen During Your Hospitalizations Provider Role Specialty Primary office phone Ryan Hanks MD Attending Provider Orthopedic Surgery [phone] Your Primary Care Physician (PCP) Primary Care Physician Office Phone Office Fax Mayra Heidi [phone] Follow-up Information Follow up With Details Comments Contact Info Ryan Hanks MD On 9/26/2017 Follow up appointment @ 10:00am Carlos MUNIZ [address] [place] [address] 
[phone] Sea Najera MD   [address] [place] [address] 
[phone] [address] to continue managing your healthcare needs. [phone] Current Discharge Medication List  
  
START taking these medications Dose & Instructions Dispensing Information Comments Morning Noon Evening Bedtime  
 aspirin 325 mg tablet Commonly known as:  ASPIRIN Your last dose was: Your next dose is:    
   
   
 Dose:  325 mg Take 1 Tab by mouth two (2) times a day. Take for 3 weeks for DVT prophylaxis. Quantity:  42 Tab Refills:  0  
     
   
   
   
  
 oxyCODONE IR 5 mg immediate release tablet Commonly known as:  Michele Santana Your last dose was: Your next dose is:    
   
   
 Dose:  5-10 mg Take 1-2 Tabs by mouth every four (4) hours as needed for Pain. Max Daily Amount: 60 mg.  
 Quantity:  60 Tab Refills:  0 CONTINUE these medications which have NOT CHANGED Dose & Instructions Dispensing Information Comments Morning Noon Evening Bedtime  
 amLODIPine 5 mg tablet Commonly known as:  Srinivas Smith Your last dose was: Your next dose is:    
   
   
 Dose:  5 mg Take 5 mg by mouth daily. Refills:  0  
     
   
   
   
  
 CALCIUM 600 + D(3) PO Your last dose was: Your next dose is:    
   
   
 Dose:  1 Tab Take 1 Tab by mouth daily. Refills:  0  
     
   
   
   
  
 hydroCHLOROthiazide 25 mg tablet Commonly known as:  HYDRODIURIL Your last dose was: Your next dose is:    
   
   
 Dose:  25 mg Take 25 mg by mouth daily. Refills:  0  
     
   
   
   
  
 levothyroxine 112 mcg tablet Commonly known as:  SYNTHROID Your last dose was: Your next dose is:    
   
   
 Dose:  112 mcg Take 112 mcg by mouth nightly. Refills:  0  
     
   
   
   
  
 niacinamide 500 mg tablet Your last dose was: Your next dose is:    
   
   
 Dose:  500 mg Take 500 mg by mouth two (2) times a day. Refills:  0  
     
   
   
   
  
 pravastatin 40 mg tablet Commonly known as:  PRAVACHOL  
   
 Your last dose was: Your next dose is:    
   
   
 Dose:  40 mg Take 40 mg by mouth daily (after dinner). Refills:  0 PriLOSEC 40 mg capsule Generic drug:  omeprazole Your last dose was: Your next dose is:    
   
   
 Dose:  40 mg Take 40 mg by mouth daily as needed. Refills:  0  
     
   
   
   
  
 VITAMIN D3 1,000 unit tablet Generic drug:  cholecalciferol Your last dose was: Your next dose is:    
   
   
 Dose:  1000 Units Take 1,000 Units by mouth daily. Refills:  0 ZyrTEC 10 mg tablet Generic drug:  cetirizine Your last dose was: Your next dose is:    
   
   
 Dose:  10 mg Take 10 mg by mouth daily. Refills:  0 STOP taking these medications   
 glucosamine-chondroitin 750-600 mg Tab ONE-A-DAY WOMEN'S 50 PLUS PO Where to Get Your Medications Information on where to get these meds will be given to you by the nurse or doctor. ! Ask your nurse or doctor about these medications  
  aspirin 325 mg tablet  
 oxyCODONE IR 5 mg immediate release tablet Discharge Instructions Armin Cervantes III, MD Sharma Hawking, PA-C Lower Extremity Surgery Discharge Instructions Please take the time to review the following instructions before you leave the hospital and use them as guidelines during your recovery from surgery. If you have any questions you may contact my office at [phone]. Wound Care/Dressing Changes: You may change your dressing as needed. Beginning the 2 days after you are discharged from the hospital you can change your dressing daily. A big, bulky dressing isn't necessary as long as there isn't any drainage from the incisions. You will be shown how to change the dressings properly by the home health aids as well.  There will be a piece of tape over your incision that resembles gauze. This tape should stay on and you should not attempt to remove it. Once you begin to get this wet in the shower this will begin to fall off on its own, although it will take days. Do not apply antibiotic ointment to your incisions. Showering/Bathing: You may shower 2 days after surgery. Your dressing may be removed for showering. You may get your incisions wet in the shower. Don't vigorously scrub the area where your incisions are. Please pat dry the incision. Apply a clean, dry dressing after drying off the area of your incisions. Don't take a tub bath, get in a swimming pool or Jacuzzi until the incisions are completely healed, and you are seen in the office by Dr. Iraj Tran. Do not soak your incisions under water. Do not get the dressing wet. Once you remove it two days from surgery, you may get the incision wet if there is no drainage. Weight Bearing Status/Braces/Activity: If you have had a total knee replacement you may weight bear as tolerated with the knee immobilizer in place. Use crutches, cane, or walker as needed. You should sleep in your knee immobilizer. You need to begin working on range of motion early after your surgery. It is very important to work on extension (straighthening) the knee. You will be advanced with walking and range of motion by physical therapy at home. If you have had a total hip replacement you may weight bear as tolerated. Physical therapy with come by your house to help you perform exercises and work on strength and range of motion. Ice/Elevation: 
 
Continue ice and elevation consistently for 48 hours after surgery. If you have had a total knee replacement when elevating your knee elevate it on about 4 pillows to be sure it is above your heart.   After 48 hours, you should ice your knee 3 times per day, for 20 minutes at a time for the next 5 days. After one week from surgery, you may use ice and elevation as needed for pain and swelling. Diet: 
 
You may advance to your regular diet as tolerated. Medication: 
 
1. You will be given a prescription for pain medications when you are discharged from the hospital.  Take the medication as needed according to the directions on the prescription bottle. Possible side effects of the medication include dizziness, headache, nausea, vomiting, constipation and urinary retention. If you experience any of these side effects call the office so that we can assist you in relieving them. Discontinue the use of the pain medication if you develop itching, rash, shortness of breath or difficulties swallowing. If these symptoms become severe or aren't relieved by discontinuing the medication you should seek immediate medical attention. Refills of pain medication are authorized during office hours only (8AM - 5PM Mon thru Fri). 2. If you were prescribed Percocet/oxycodone or Dilaudid/hydromorphone you must have a written prescription. These medications legally cannot be called in to a pharmacy. 3. Do not take Tylenol in addition to your pain medication as most of the pain medication already contains Tylenol. Exceptions include Dilaudid/hydromorphone, Demerol/meperidine and roxicodone. Do not exceed 3000 mg of Tylenol per day. Ex:  (hydrocodone 5/325mg = 325 mg of Tylenol) 4. You should use Aspirin 325 mg twice daily for 21 days from the date of your surgery. This will help to prevent blood clots from forming in your legs. This needs to be started the day after your surgery and home healthcare will teach you how this is done. If you are taking another medication such as Xarelto as discussed with Dr. Reed Blake this should also be started the day after the surgery. 5. You may resume the medications you were taking prior to your surgery, unless otherwise specified in your discharge instructions.   Pain medication may change the effects of any antidepressant medication. If you have any questions about possible interactions between your regular medications and the pain medication you should consult the physician who prescribes your regular medications. 6. If you had a total joint as an outpatient procedure and did not spend the night in the hospital, Dr. Reed Blake has written for an oral antibiotic that you should take as instructed. This antibiotic is generally Keflex or Clindamycin. If you spent the night in the hospital the antibiotic was given to you through the IV and there is nothing else to take orally. Follow Up Appointment: If you are unsure of your follow-up appointment date and time, please call [phone]. Please let our  know you are scheduling a post-op appointment. Most appointments should be between 7-14 days after your surgery. Physical Therapy: 
 
   Physical therapy will be discussed with you at your first follow-up appointment with Dr. Reed Blake. You don't need to begin physical therapy prior to that visit. You are to participate with [address] [place] [address] as arranged pre-operatively in the convience of your own home. Important signs and symptoms: 
 
If any of the following signs and symptoms occurs, you should contact Dr. Reed Blake' office. Please be advised if a problem arises which you feel required immediate medical attention or your are unable to contact Dr. Reed Blake' office you should seek immediate medical attention. Signs and symptoms to watch for include: 1. A sudden increase in swelling and/or redness or warmth at the area your surgery was performed which isn't relieved by rest, ice and elevation. 2. Oral temperature greater than 101.5 degrees for 12 hours or more which isn't relieved by an increase in fluid intake and taking two Tylenol every 4-6 hours. Do not exceed 3000 mg of Tylenol per day. 3. Excessive drainage from your incisions or drainage that hasn't stopped by 72 hours. 4. Calf pian, tenderness, redness or swelling which isn't relieved with rest and elevation. 5. Fever, chills, shortness of breath, chest pain, nausea, vomiting or other signs and symptoms which are of concern to you. Discharge Orders None Introducing [facility]! Luli Thomas introduces [facility] patient portal. Now you can access parts of your medical record, email your doctor's office, and request medication refills online. 1. In your internet browser, go to https://[employer]. [employer] 2. Click on the First Time User? Click Here link in the Sign In box. You will see the New Member Sign Up page. 3. Enter your [facility] Access Code exactly as it appears below. You will not need to use this code after youve completed the sign-up process. If you do not sign up before the expiration date, you must request a new code. · [facility] Access Code: [email or web address] Expires: 10/9/2017  3:48 PM 
 
4. Enter the last four digits of your Social Security Number (xxxx) and Date of Birth (mm/dd/yyyy) as indicated and click Submit. You will be taken to the next sign-up page. 5. Create a [facility] ID. This will be your [facility] login ID and cannot be changed, so think of one that is secure and easy to remember. 6. Create a [facility] password. You can change your password at any time. 7. Enter your Password Reset Question and Answer. This can be used at a later time if you forget your password. 8. Enter your e-mail address. You will receive e-mail notification when new information is available in [address] 9. Click Sign Up. You can now view and download portions of your medical record. 10. Click the Download Summary menu link to download a portable copy of your medical information.  
 
If you have questions, please visit the Frequently Asked Questions section of the [facility]. Remember, MyChart is NOT to be used for urgent needs. For medical emergencies, dial 911. Now available from your iPhone and Android! General Information Please provide this summary of care documentation to your next provider. Patient Signature:  ____________________________________________________________ Date:  ____________________________________________________________  
  
Gearldine Moulds Provider Signature:  ____________________________________________________________ Date:  ____________________________________________________________

## 2017-09-13 NOTE — ANESTHESIA PROCEDURE NOTES
Peripheral Block    Start time: 2017 11:30 AM  End time: 2017 11:36 AM  Performed by: Rosalinda Huerta  Authorized by: Rosalinda Huerta       Pre-procedure: Indications: at surgeon's request    Preanesthetic Checklist: patient identified, risks and benefits discussed, site marked, timeout performed, anesthesia consent given and patient being monitored      Block Type:   Block Type:  Femoral single shot  Monitoring:  Standard ASA monitoring, continuous pulse ox, frequent vital sign checks, heart rate, responsive to questions and oxygen  Injection Technique:  Single shot  Procedures: ultrasound guided    Patient Position: supine    Assessment:  Number of attempts:  1  Injection Assessment:  Incremental injection every 5 mL, local visualized surrounding nerve on ultrasound, negative aspiration for CSF, negative aspiration for blood, no paresthesia, no intravascular symptoms and ultrasound image on chart  Patient tolerance:  Patient tolerated the procedure well with no immediate complications  Althia Gelineau   = [id number]  CSN = [id number]    Femoral nerve on identified by ultrasound just proximal to adductor canal.    Local anesthetic with Lidocaine 2% with 1:200K epinephrine with assistance from ultrasound. Local anesthetic injected without resistance and with gentle aspiration every 3-5 ml with direct visualization with ultrasound     Patient tolerated procedure well, vital signs stable throughout, with no apparent complications. Entire procedure completed prior to start of anesthesia time.      Althia Gelineau   = [id number]  CSN = [id number]

## 2017-09-13 NOTE — PROGRESS NOTES
Problem: Falls - Risk of  Goal: *Absence of Falls  Document Tom Fall Risk and appropriate interventions in the flowsheet.    Outcome: Progressing Towards Goal  Fall Risk Interventions:  Mobility Interventions: Patient to call before getting OOB, PT Consult for mobility concerns     Mentation Interventions: Adequate sleep, hydration, pain control     Medication Interventions: Patient to call before getting OOB, Teach patient to arise slowly     Elimination Interventions: Call light in reach, Patient to call for help with toileting needs

## 2017-09-13 NOTE — PERIOP NOTES
Called to  and message left on VM regarding room number.  Initially briefed on PACU status by [employer]

## 2017-09-13 NOTE — PERIOP NOTES
TRANSFER - OUT REPORT:    Verbal report given to CHASITY Ibarra(name) on Pj Kerr  being transferred to [id number], 2s(unit) for routine progression of care       Report consisted of patients Situation, Background, Assessment and   Recommendations(SBAR). Information from the following report(s) OR Summary, Procedure Summary, Intake/Output, MAR and Recent Results was reviewed with the receiving nurse. Lines:   Peripheral IV 09/13/17 Right; Inner Forearm (Active)   Site Assessment Clean, dry, & intact 9/13/2017  2:18 PM   Phlebitis Assessment 0 9/13/2017  2:18 PM   Infiltration Assessment 0 9/13/2017  2:18 PM   Dressing Status Clean, dry, & intact 9/13/2017  2:18 PM   Dressing Type Tape;Transparent 9/13/2017  2:18 PM   Hub Color/Line Status Infusing 9/13/2017  2:18 PM        Opportunity for questions and clarification was provided.       Patient transported with:   O2 @ 2lnc liters   Also reviewed history as recorded in EMR, and will hold patient in PACu until 1545 for Dilaudid 30 minutes

## 2017-09-13 NOTE — ANESTHESIA PREPROCEDURE EVALUATION
Anesthetic History   No history of anesthetic complications     Pertinent negatives: No PONV       Review of Systems / Medical History  Patient summary reviewed, nursing notes reviewed and pertinent labs reviewed    Pulmonary              Pertinent negatives: No COPD, asthma and smoker     Neuro/Psych   Within defined limits           Cardiovascular    Hypertension: well controlled            Pertinent negatives: No past MI and CAD       GI/Hepatic/Renal     GERD: poorly controlled        Pertinent negatives: No liver disease and renal disease   Endo/Other      Hypothyroidism  Arthritis  Pertinent negatives: No diabetes   Other Findings   Comments: etoh 3/ week           Physical Exam    Airway  Mallampati: III  TM Distance: 4 - 6 cm  Neck ROM: normal range of motion   Mouth opening: Normal     Cardiovascular    Rhythm: regular  Rate: normal         Dental  No notable dental hx       Pulmonary  Breath sounds clear to auscultation               Abdominal         Other Findings            Anesthetic Plan    ASA: 2  Anesthesia type: general and regional - femoral single shot          Induction: Intravenous  Anesthetic plan and risks discussed with: Patient      N injury risk

## 2017-09-13 NOTE — IP AVS SNAPSHOT
Uriel Vasquez 
 
 
 [address] [place] [address] 
[phone] Patient: Kelly Adams MRN: [id number] XZV:[birth year] Current Discharge Medication List  
  
START taking these medications Dose & Instructions Dispensing Information Comments Morning Noon Evening Bedtime  
 aspirin 325 mg tablet Commonly known as:  ASPIRIN Your last dose was: Your next dose is:    
   
   
 Dose:  325 mg Take 1 Tab by mouth two (2) times a day. Take for 3 weeks for DVT prophylaxis. Quantity:  42 Tab Refills:  0  
     
   
   
   
  
 oxyCODONE IR 5 mg immediate release tablet Commonly known as:  [facility] Your last dose was: Your next dose is:    
   
   
 Dose:  5-10 mg Take 1-2 Tabs by mouth every four (4) hours as needed for Pain. Max Daily Amount: 60 mg.  
 Quantity:  60 Tab Refills:  0 CONTINUE these medications which have NOT CHANGED Dose & Instructions Dispensing Information Comments Morning Noon Evening Bedtime  
 amLODIPine 5 mg tablet Commonly known as:  Maxim Zayas Your last dose was: Your next dose is:    
   
   
 Dose:  5 mg Take 5 mg by mouth daily. Refills:  0  
     
   
   
   
  
 CALCIUM 600 + D(3) PO Your last dose was: Your next dose is:    
   
   
 Dose:  1 Tab Take 1 Tab by mouth daily. Refills:  0  
     
   
   
   
  
 hydroCHLOROthiazide 25 mg tablet Commonly known as:  HYDRODIURIL Your last dose was: Your next dose is:    
   
   
 Dose:  25 mg Take 25 mg by mouth daily. Refills:  0  
     
   
   
   
  
 levothyroxine 112 mcg tablet Commonly known as:  SYNTHROID Your last dose was: Your next dose is:    
   
   
 Dose:  112 mcg Take 112 mcg by mouth nightly. Refills:  0  
     
   
   
   
  
 niacinamide 500 mg tablet Your last dose was: Your next dose is: Dose:  500 mg Take 500 mg by mouth two (2) times a day. Refills:  0  
     
   
   
   
  
 pravastatin 40 mg tablet Commonly known as:  PRAVACHOL Your last dose was: Your next dose is:    
   
   
 Dose:  40 mg Take 40 mg by mouth daily (after dinner). Refills:  0 PriLOSEC 40 mg capsule Generic drug:  omeprazole Your last dose was: Your next dose is:    
   
   
 Dose:  40 mg Take 40 mg by mouth daily as needed. Refills:  0  
     
   
   
   
  
 VITAMIN D3 1,000 unit tablet Generic drug:  cholecalciferol Your last dose was: Your next dose is:    
   
   
 Dose:  1000 Units Take 1,000 Units by mouth daily. Refills:  0 ZyrTEC 10 mg tablet Generic drug:  cetirizine Your last dose was: Your next dose is:    
   
   
 Dose:  10 mg Take 10 mg by mouth daily. Refills:  0 STOP taking these medications   
 glucosamine-chondroitin 750-600 mg Tab ONE-A-DAY WOMEN'S 50 PLUS PO Where to Get Your Medications Information on where to get these meds will be given to you by the nurse or doctor. ! Ask your nurse or doctor about these medications  
  aspirin 325 mg tablet  
 oxyCODONE IR 5 mg immediate release tablet

## 2017-09-13 NOTE — PROGRESS NOTES
1634 - Assumed care of patient at this time. Patient denies chest pain, shortness of breath, or numbness or tingling in the upper or lower extremities. Dressing on the right knee is clean dry and intact. SCDs in place on the patient. 18g IV in right forearm is patent and infusing LR @ 75mL/hr. Patient currently experiencing 0/10 pain. Bed is in lowest position with the wheels locked. Siderails x 3 are up. Call bell within reach. Patient is currently resting in bed in no apparent distress. 1650 - Patient up to bedside commode. Pt voided 400 cc of clear, yellow urine. Patient returned to bed. Pt tolerated the movement well. Will continue to monitor. 1700 - Head to toe assessment performed at this time. Patient has no complaints of chest pain or shortness of breath. Denies any numbness or tingling in extremities. Lungs are clear to auscultation. Bowel sounds are present in all 4 quadrants. Patient educated how to  actively manage their pain. Patient encouraged to use the incentive spirometer. Patient educated on the side effects of medications. Explained the importance of ambulation. Patient left in bed with call light within reach, bed in lowest position with wheels locked, and siderails x 3 up. Will continue to monitor. Shift Summary - Patient has had an uneventful afternoon. Patient used the bedside commode and walked to the doorway with physical therapy. Patient is very sensitive to narcotic medications, and her 02 saturation drops into the middle to upper 80s when she falls asleep. I replaced the nasal cannula and continued her on oxygen at 2L. I stressed the importance of incentive spirometry use while she is awake. Patient encouraged to continue to actively manage pain and call for assistance before getting out of bed. Patient left in room with no signs of distress. Call bell within reach, bed in lowest position with wheels locked, and siderails x 3 up.

## 2017-09-13 NOTE — OP NOTES
Right Cruciate Retaining Cemented ConforMIS Total Knee Arthroplasty    Patient: Korina Boykin MRN: [id number]  CSN: [id number]   YOB: 1950  Age: 78 y.o. Sex: female      Date of Surgery:9/13/2017    PREOPERATIVE DIAGNOSES : RIGHT KNEE OSTEOARTHRITIS    POSTOPERATIVE DIAGNOSES : RIGHT KNEE OSTEOARTHRITIS    PROCEDURES PERFORMED: Right cemented cruciate-retaining total  knee arthroplasty using the ConforMIS knee system.     IMPLANTS:   Implant Name Type Inv. Item Serial No.  Lot No. LRB No. Used Action   CEMENT BNE FAST SET 20GM --  - [id number]  CEMENT BNE FAST SET 20GM --   [employer] [id number] Right 3 Implanted   ITOTAL FEMORAL IMPLANT   [id number] CONFORMIS  Right 1 Implanted   ITOTAL TIBIAL TRAY   [id number] CONFORMIS  Right 1 Implanted   ITOTAL - [id number] IMPLANT    CONFORMIS [id number] Right 1 Implanted   8MM MEDIAL INSERT   [id number] CONFORMIS  Right 1 Implanted   LATERAL Estel Nipper     [id number] CONFORMIS   Right 1 Implanted       SURGEON: Armin Cervantes MD    ASSISTANTS: Emerita Love PA-C    ANESTHESIA: General    TOURNIQUET TIME:  Approximately 70 minutes at 350 mmHg. SPECIMEN TO PATHOLOGY:  * No specimens in log *    ESTIMATED BLOOD LOSS: Minimal    FINDINGS:  Same    COMPLICATIONS:  None     INDICATIONS:  A 78 y.o.  female with known right severe gonarthrosis. The patient has bone-on-bone arthritis by x-rays and has failed all conservative interventions including medications, weight loss, physical therapy, relative rest, ambulatory aids and injections. The patient now presents for a right total knee arthroplasty due to constant pain with activities of daily living and diminished safety due to patients pain.       DESCRIPTION OF PROCEDURE: The patient was brought to the  operating theater. After adequate anesthesia, the right knee was  prepped and draped in the typical sterile fashion.  The knee was then  exsanguinated with an Esmarch bandage and tourniquet inflated to  350 mmHg. The longitudinal incision was then made from about inch  and a half above the patella to just medial of the tibial tubercle. Medial  and lateral flaps were developed and then using about 40 mL of a  mixture of 20 mL of Exparel, 50 mL of 0.25% Marcaine with  epinephrine and 30 mL of saline for a total of 100 mL. This was  injected in the skin and then the parapatellar soft tissues for  anesthesia. A mid vastus approach to the knee was then performed,  and dissection was carried on the proximal medial tibia from anterior to  posterior, removing the anterior horn of the medial meniscus. The  patellar tendon was released down to its insertion and freeing up the  fat pad itself. The patella was then slid lateral and the knee flexed to  greater than 90 degrees. The F1 guide of the ConforMIS system was then placed on the distal femur and using the coring tool the cartilage was removed from the medial and lateral distal femoral condyles. This guide was then removed and the distal femoral  cutting guide was then placed on the distal femur at the 0 position. The  2 drill holes were then drilled distally and then 3 pins were placed  anteriorly, stabilizing the distal femoral resection guide. I then used  a reciprocating saw to cut between the medial and lateral condyle  to the appropriate depth on the  jig and then I followed the step cut with  a smaller saw blade on the distal medial and lateral condyles. The  distal femoral cut was then checked and found to be good. Attention  was then turned to the tibia, which was brought into the wound. The tibial guide, which matched up with the patient's anatomic slope was  placed on the anteromedial aspect of the tibia with good fit and the 2  areas that touched the proximal tibia were marked with a marker and  then this cartilage was removed with same coring tool.  The guide was  then replaced and pinned and then the proximal tibia was resected. Extension was then checked and found to be stable at 0 degrees. Any remnant of posterior horn of the meniscus medially or laterally was  removed. I put another 40 mL of the anesthetic mixture posteromedial  posterolateral and along the capsule and the periosteum of the femur  and tibia. Attention was then turned back to the femur and the flexion  and extension gaps were performed and found to be stable. The F4  guide was then placed on the distal cut of the femur and pinned at 0  degrees and anterior cut, the anterior chamfer and the posterior medial  and posterolateral cuts were then performed. The guide was then  removed and the F5 guide was placed on the distal cut of the femur  and the 2 posterior chamfers were performed and the lug holes were  then drilled. Once this was done, the trial femur was placed on the cut  through the femur with good fit. Attention was turned back to the tibia  where the guide system was placed and the appropriate sized medial and lateral spacers allowed good stability in extension and good stability at mid  flexion and at 90 degrees. The knee was then ranged and the  alignment was just medial to the tibial tubercle. This was marked with  the Bovie. The femur was then removed and the 2 pins were placed in  the tibial guide, the alignment guide for the central peg hole of the  tibial component was then placed and the drill was drilled to  completion. The keel punch was then placed next which went easily. All components were then removed and the patella was then everted,  cut from initial thickness of about 25 to residual thickness of about 15  mm. The correct size patella was then measured for the cut surface of the  patella. The lateral border of the patella was then resected and the lateral retinaculum was released with the Bovie. The ConforMIS components were then placed on the table, and the DePuy #2 cement was mixed on the back table.  The cement was  then placed in the tibia and the tibial component was impacted into  position with good fit, and all excessive cement was removed with  pickups and a knife. The femur was cemented next after insertion of  the appropriate tibial polyethylene's and they were snapped into position with  good fit. The femur was then inserted, fully seated and any excessive  cement was removed with pickups and a knife. The knee was  extended fully with anterior compression and flexed. Any excessive  cement was removed at this time. The patella was then cemented and clamped in position until it was hardened. All excessive cement was removed as well. The patient  at this time had full extension, had flexion to about 125 degrees, had  good midflexion stability, the patella tracks nice with a no-thumbs  technique. The wound was then irrigated out with copious amounts of  irrigation. The additional 20 mL of Marcaine was injected in the skin  and the capsule was closed with running #1 Stratafix. The skin was  then closed with inverted 2-0 Vicryls and then the Dermabond Prineo  skin system was used for final filling of the wound. This was dressed  with 4 x 4's and an Ace wrap from the toes to mid thigh. The patient  was put in a knee immobilizer and returned to the recovery room  awake, in stable condition. All instrument, sponge and needle counts  were correct.     Annie De La Torre MD  9/13/2017

## 2017-09-13 NOTE — ROUTINE PROCESS
TRANSFER - IN REPORT:    Verbal report received from Elida Iniguez RN (name) on Efra Mcguire being received from PACU(unit) for routine post - op. .      Report consisted of patients Situation, Background, Assessment and   Recommendations(SBAR). Information from the following report(s) SBAR, OR Summary, Intake/Output and MAR was reviewed with the receiving nurse. Opportunity for questions and clarification was provided. Assessment completed upon patients arrival to unit and care assumed.

## 2017-09-14 VITALS
HEART RATE: 76 BPM | BODY MASS INDEX: 23.83 KG/M2 | WEIGHT: 157.25 LBS | SYSTOLIC BLOOD PRESSURE: 149 MMHG | HEIGHT: 68 IN | DIASTOLIC BLOOD PRESSURE: 64 MMHG | OXYGEN SATURATION: 95 % | RESPIRATION RATE: 18 BRPM | TEMPERATURE: 98.2 F

## 2017-09-14 LAB
ANION GAP SERPL CALC-SCNC: 6 MMOL/L (ref 3–18)
BUN SERPL-MCNC: 9 MG/DL (ref 7–18)
BUN/CREAT SERPL: 18 (ref 12–20)
CALCIUM SERPL-MCNC: 8.3 MG/DL (ref 8.5–10.1)
CHLORIDE SERPL-SCNC: 99 MMOL/L (ref 100–108)
CO2 SERPL-SCNC: 29 MMOL/L (ref 21–32)
CREAT SERPL-MCNC: 0.51 MG/DL (ref 0.6–1.3)
GLUCOSE SERPL-MCNC: 122 MG/DL (ref 74–99)
HCT VFR BLD AUTO: 28.7 % (ref 35–45)
HGB BLD-MCNC: 9.9 G/DL (ref 12–16)
POTASSIUM SERPL-SCNC: 3.5 MMOL/L (ref 3.5–5.5)
SODIUM SERPL-SCNC: 134 MMOL/L (ref 136–145)

## 2017-09-14 PROCEDURE — 36415 COLL VENOUS BLD VENIPUNCTURE: CPT | Performed by: PHYSICIAN ASSISTANT

## 2017-09-14 PROCEDURE — 97110 THERAPEUTIC EXERCISES: CPT

## 2017-09-14 PROCEDURE — 74011250636 HC RX REV CODE- 250/636: Performed by: PHYSICIAN ASSISTANT

## 2017-09-14 PROCEDURE — 80048 BASIC METABOLIC PNL TOTAL CA: CPT | Performed by: PHYSICIAN ASSISTANT

## 2017-09-14 PROCEDURE — 74011250637 HC RX REV CODE- 250/637: Performed by: PHYSICIAN ASSISTANT

## 2017-09-14 PROCEDURE — 97116 GAIT TRAINING THERAPY: CPT

## 2017-09-14 PROCEDURE — 97530 THERAPEUTIC ACTIVITIES: CPT

## 2017-09-14 PROCEDURE — 85018 HEMOGLOBIN: CPT | Performed by: PHYSICIAN ASSISTANT

## 2017-09-14 RX ORDER — OXYCODONE HYDROCHLORIDE 5 MG/1
5-10 TABLET ORAL
Qty: 60 TAB | Refills: 0 | Status: SHIPPED | OUTPATIENT
Start: 2017-09-14

## 2017-09-14 RX ORDER — ASPIRIN 325 MG
325 TABLET ORAL 2 TIMES DAILY
Qty: 42 TAB | Refills: 0 | Status: SHIPPED | OUTPATIENT
Start: 2017-09-14

## 2017-09-14 RX ADMIN — VITAMIN D, TAB 1000IU (100/BT) 1000 UNITS: 25 TAB at 08:23

## 2017-09-14 RX ADMIN — VANCOMYCIN HYDROCHLORIDE 1250 MG: 10 INJECTION, POWDER, LYOPHILIZED, FOR SOLUTION INTRAVENOUS at 00:32

## 2017-09-14 RX ADMIN — HYDROCHLOROTHIAZIDE 25 MG: 25 TABLET ORAL at 08:22

## 2017-09-14 RX ADMIN — DIPHENHYDRAMINE HYDROCHLORIDE 25 MG: 25 CAPSULE ORAL at 12:37

## 2017-09-14 RX ADMIN — AMLODIPINE BESYLATE 5 MG: 5 TABLET ORAL at 08:22

## 2017-09-14 RX ADMIN — ASPIRIN 325 MG ORAL TABLET 325 MG: 325 PILL ORAL at 08:22

## 2017-09-14 RX ADMIN — OXYCODONE HYDROCHLORIDE 10 MG: 5 TABLET ORAL at 13:20

## 2017-09-14 RX ADMIN — SODIUM CHLORIDE, SODIUM LACTATE, POTASSIUM CHLORIDE, AND CALCIUM CHLORIDE 75 ML/HR: 600; 310; 30; 20 INJECTION, SOLUTION INTRAVENOUS at 05:34

## 2017-09-14 RX ADMIN — OXYCODONE HYDROCHLORIDE 10 MG: 5 TABLET ORAL at 03:57

## 2017-09-14 RX ADMIN — DIPHENHYDRAMINE HYDROCHLORIDE 25 MG: 25 CAPSULE ORAL at 03:57

## 2017-09-14 RX ADMIN — LORATADINE 10 MG: 10 TABLET ORAL at 08:22

## 2017-09-14 RX ADMIN — ONDANSETRON 4 MG: 4 TABLET, ORALLY DISINTEGRATING ORAL at 07:30

## 2017-09-14 RX ADMIN — CALCIUM CARBONATE-VITAMIN D TAB 500 MG-200 UNIT 1 TABLET: 500-200 TAB at 08:22

## 2017-09-14 RX ADMIN — OXYCODONE HYDROCHLORIDE 10 MG: 5 TABLET ORAL at 08:23

## 2017-09-14 RX ADMIN — DIPHENHYDRAMINE HYDROCHLORIDE 25 MG: 25 CAPSULE ORAL at 07:37

## 2017-09-14 NOTE — PROGRESS NOTES
attempted visit with patient but she was  unavailable. Chaplains remain available to provide pastoral support to patient and family as needed and requested. Rev.  Calixto Jolly  [phone]

## 2017-09-14 NOTE — PROGRESS NOTES
Problem: Pain  Goal: *Control of Pain  Outcome: Progressing Towards Goal  Pt progressing towards goal    Comments:   Pt has goal of bringing pain to a 4 by discharge

## 2017-09-14 NOTE — PROGRESS NOTES
Problem: Mobility Impaired (Adult and Pediatric)  Goal: *Acute Goals and Plan of Care (Insert Text)  In 1-7 days pt will be able to perform:  ST. Bed mobility: Rolling L to R to L modified independent for positioning. 2. Supine to sit to supine S with HR for meals. 3. Sit to stand to sit S with RW in prep for ambulation. LT. Gait: Ambulate >150ft S with RW, WBAT, for home/community mobility. 2. Stair Negotiation: Ascend/descend >3 steps CGA with HR for home entry. 3. Activity tolerance: Tolerate up in chair 1-2 hours for ADLs. 4. Patient/Family Education: Patient/family to be independent with HEP for follow-up care and safe discharge. Outcome: Resolved/Met Date Met:  17  PHYSICAL THERAPY TREATMENT/DISCHARGE     Patient: Salome Farley (77 y.o. female)  Date: 2017  Diagnosis: OSTEOARTHRITIS RIGHT KNEE  Right knee DJD Primary osteoarthritis of right knee  Procedure(s) (LRB):  RIGHT TOTAL KNEE ARTHROPLASTY (CONFORMIS) (Right) 1 Day Post-Op  Precautions: Fall, WBAT  Chart, physical therapy assessment, plan of care and goals were reviewed. ASSESSMENT:  Pt without symptoms of light headedness or dizziness this session. Pain rated 4/10 and 6/10 pre/post respectively. Pt demonstrates ability to perform bed mobility/transfers and gt level surface with supervision. Laura slow, step to pattern with decreased stance time R LE. Pt educated in stair negotiation and completed same with CGA using both or R handrail. Completed HEP per protocol seated/supine. AROM -10>50 deg. Educated in safety with tranfers sit<>stand commode with 3 in 1 attachment and pt practiced same with S. Pt spouse present and able to assist as needed. Pt returned to room and left supine in bed in NAD with all needs in reach. Pt ready to progress to next level of care with HHPT. Nurse Philippe notified.   Progression toward goals:  [X]      Goals met  [ ]      Improving appropriately and progressing toward goals  [ ] Improving slowly and progressing toward goals  [ ]      Not making progress toward goals and plan of care will be adjusted       PLAN:  Patient will be discharged from physical therapy at this time. Rationale for discharge:  [X] Goals Achieved  [ ] Lenetta Pro  [ ] Patient not participating in therapy  [ ] Other:  Discharge Recommendations:  Home Health  Further Equipment Recommendations for Discharge:  N/A       SUBJECTIVE:   Patient stated I feel much better now.       OBJECTIVE DATA SUMMARY:   Critical Behavior:  Neurologic State: Alert, Appropriate for age  Orientation Level: Oriented X4  Cognition: Appropriate decision making, Appropriate for age attention/concentration, Appropriate safety awareness, Follows commands  Safety/Judgement: Awareness of environment  Functional Mobility Training:  Bed Mobility:  Supine to Sit: Supervision  Sit to Supine: Supervision  Transfers:  Sit to Stand: Supervision  Stand to Sit: Supervision  Balance:  Sitting: Intact  Standing: Intact; With support  Ambulation/Gait Training:  Distance (ft): 165 Feet (ft)  Assistive Device: Gait belt;Walker, rolling  Ambulation - Level of Assistance: Supervision  Gait Abnormalities: Antalgic; Step to gait  Right Side Weight Bearing: As tolerated  Stance: Right decreased  Speed/Laura: Slow  Step Length: Right lengthened;Left lengthened  Swing Pattern: Right asymmetrical;Left asymmetrical  Interventions: Safety awareness training;Verbal cues; Visual/Demos  Stairs:  Number of Stairs Trained: 9  Stairs - Level of Assistance: Contact guard assistance              Rail Use: Right  (and both)  Therapeutic Exercises:   Per Dr. Fred Porter protocol seated  Pain:  Pain Scale 1: Numeric (0 - 10)  Pain Intensity 1: 6  Pain Location 1: Knee  Pain Orientation 1: Right  Pain Description 1: Aching  Pain Intervention(s) 1: Medication (see MAR)  Activity Tolerance:   Good   Please refer to the flowsheet for vital signs taken during this treatment.   After treatment:   [ ] Patient left in no apparent distress sitting up in chair  [X] Patient left in no apparent distress in bed  [X] Call bell left within reach  [X] Nursing notified-Rebeka  [X] Caregiver present-Spouse  [ ] Bed alarm activated  Zelalem Holloway, PT   Time Calculation: 25 mins

## 2017-09-14 NOTE — PROGRESS NOTES
Discharge instructions reviewed with patient at this time. Opportunity for questions and clarification was provided. Patient has verbalized understanding. Patient was provided with care notes to include side effects of RX's. Arm bands removed and shredded. AVS reviewed with Afsaneh Rodrigez RN. IV removed.

## 2017-09-14 NOTE — PROGRESS NOTES
0730 Assumed care of pt at this time. Pt in bed with no signs of distress. Pt left with call light within reach and encouraged to call for assistance. 0730 Head to toe assessment completed at this time  Patient is A&OX4. Pt denies N/V chest pain and SOB or distress. Pt is calm and cooperative. Pedal pulses are present. Capillary refill less than 3 seconds. Skin in warm and dry with ACE wrap dressing on right knee and is CDI. Lungs are clear bilaterally. Patient instructed on use and reason for incentive spirometer. Bowel sounds are active. Abdomen is soft and non-tender. SCDS in place bilaterally . Positive dorsalis pedis pulse, sensation, warm. No tingling or numbness to lower extremities. 18 g needle in the RFA. Pain scale explained to patient. Reasons for taking PRN meds explained to patient. Patient instructed to call for prn when needed. Pain level is 5, medicated as per MAR. Patient was oriented to call bell and bed function. Will continue to monitor    1320 Pt state pain as 6/10. Pt received medication as per MAR. Potential medication side effects explained to patient, patient verbalizes understanding, opportunities for questions provided. Patient stable, no apparent distress at this time, bed in locked position, call bell within reach.

## 2017-09-14 NOTE — PROGRESS NOTES
0  - Introduced to pt and pt's partner. Assessed pt vitals, LDA's, knee, carlos, tre while taking report. Took pain assessment from pt while encouraging incentive spirometer use. Asprin 325 mg and synthroid 112 mcg were given to pt. Niacinamide was refused by pt, pt stated \"I don't need that tonight\". Benadryl 25 mg was given with a wait time of 30 minutes before Oxycodone 5 mg was given after pain assessment was taken. Assisted pt with toileting before pt went to sleep. 0000 - Assessed pt. Encouraged incentive spirometer use. Took pain assessment from pt. Benadryl 25 mg was given with a wait time of 30 minutes before Oxycodone 5 mg was given after pain assessment was taken. Assisted pt with toileting. Vancomycin hung. Pt resting in bed.    0400 - Assessed pt. Pt sleeping in bed. Vitals assessed. Pt woke up, and was then assisted to toilet. Took pain assessment from pt. Vancomycin hung, LR hung. Pt went back to sleep. Shift Summary: Pt had a great night. Pt was responsive and alert, pain level was around 5 the whole night. Pt voided easily, with assistance to ambulate to bedside commode. Pt was responsive to learning and instruction.

## 2017-09-14 NOTE — PROGRESS NOTES
Problem: Mobility Impaired (Adult and Pediatric)  Goal: *Acute Goals and Plan of Care (Insert Text)  In 1-7 days pt will be able to perform:  ST. Bed mobility: Rolling L to R to L modified independent for positioning. 2. Supine to sit to supine S with HR for meals. 3. Sit to stand to sit S with RW in prep for ambulation. LT. Gait: Ambulate >150ft S with RW, WBAT, for home/community mobility. 2. Stair Negotiation: Ascend/descend >3 steps CGA with HR for home entry. 3. Activity tolerance: Tolerate up in chair 1-2 hours for ADLs. 4. Patient/Family Education: Patient/family to be independent with HEP for follow-up care and safe discharge. Outcome: Progressing Towards Goal  PHYSICAL THERAPY TREATMENT     Patient: Lula Hope (75 y.o. female)  Date: 2017  Diagnosis: OSTEOARTHRITIS RIGHT KNEE  Right knee DJD Primary osteoarthritis of right knee  Procedure(s) (LRB):  RIGHT TOTAL KNEE ARTHROPLASTY (CONFORMIS) (Right) 1 Day Post-Op  Precautions: Fall, WBAT   Chart, physical therapy assessment, plan of care and goals were reviewed. ASSESSMENT:  Pt found supine in bed. Pt spouse present in room as well. Pt reports episode of nausea this am upon sitting up, however states this has resolved since receiving medication for same. Pt completed therapeutic ex supine and seated per HEP as noted below. AROM -10-40 deg. Educated in transfers technique. Progressed to CGA/vc for transfers and able to progress gt distance to 55ft with RW/CGA/WBAT with slow, step to gt. Verbal cues for safety/sequencing initially. Pt returned to sit EOB, then c/o light headedness and dizziness. BP 99/62, HR 71 O2 sat on RA 96%. Pt given cold, wet cloth and assisted back to supine. /59, and after 4 min 123/73. Pt symptoms resolving. Pain pre/post session R knee 3/10 and 5/10 respectively. Will continue PT in pm for goals as stated. Nurse notified of above.   Progression toward goals:  [X]      Improving appropriately and progressing toward goals  [ ]      Improving slowly and progressing toward goals  [ ]      Not making progress toward goals and plan of care will be adjusted       PLAN:  Patient continues to benefit from skilled intervention to address the above impairments. Continue treatment per established plan of care. Discharge Recommendations:  Home Health  Further Equipment Recommendations for Discharge:  N/A       SUBJECTIVE:   Patient stated Oh, just in time, I was just wondering when you were coming.       OBJECTIVE DATA SUMMARY:   Critical Behavior:  Neurologic State: Alert, Appropriate for age  Orientation Level: Oriented X4  Cognition: Appropriate decision making, Appropriate for age attention/concentration, Appropriate safety awareness, Follows commands  Safety/Judgement: Awareness of environment  Functional Mobility Training:  Bed Mobility:  Supine to Sit: Contact guard assistance;Stand-by asssistance  Sit to Supine: Contact guard assistance  Transfers:  Sit to Stand: Contact guard assistance; Other (comment) (vc)  Stand to Sit: Contact guard assistance; Other (comment) (vc)  Balance:  Sitting: Intact  Standing: Intact; With support  Ambulation/Gait Training:  Distance (ft): 55 Feet (ft)  Assistive Device: Gait belt;Walker, rolling  Gait Abnormalities: Antalgic;Decreased step clearance; Step to gait  Right Side Weight Bearing: As tolerated  Speed/Laura: Slow  Swing Pattern: Left asymmetrical;Right asymmetrical  Interventions: Safety awareness training;Verbal cues  Therapeutic Exercises:         EXERCISE   Sets   Reps   Active Active Assist   Passive Self ROM   Comments   Ankle Pumps/Circles 1 20  [X] [ ] [ ] [ ]     Quad Sets/Glut Sets 1 10 [X] [ ] [ ] [ ]     Heel Slides 1 10 [X] [ ] [ ] [ ]     Long Arc Quads 1 8 [ ] [X] [ ] [ ] Self assist      Pain:  Pain Scale 1: Numeric (0 - 10)  Pain Intensity 1: 5  Pain Location 1: Knee  Pain Orientation 1: Right  Pain Description 1: Throbbing;Aching (post activity)  Pain Intervention(s) 1: Medication (see MAR)  Activity Tolerance:   Fair-  Please refer to the flowsheet for vital signs taken during this treatment.   After treatment:   [ ] Patient left in no apparent distress sitting up in chair  [X] Patient left in no apparent distress in bed  [X] Call bell left within reach  [X] Nursing notified-Rebeka  [X] Caregiver present-spouse  [ ] Bed alarm activated      Zelalem Holloway PT   Time Calculation: 48 mins

## 2017-09-14 NOTE — PROGRESS NOTES
Progress Note      Patient: Danie Whitney               Sex: female          DOA: 9/13/2017     YOB: 1950      Age:  78 y.o.        LOS:  LOS: 1 day     Status Post: Procedure(s):  RIGHT TOTAL KNEE ARTHROPLASTY ([place])  Surgery Date: 9/13/2017            Subjective:     Danie Whitney is a 78 y.o. female without c/o nausea. Pain reasonably well controlled with PO medications. Patient denies any complaint of calf pain/ SOB or difficulty breathing. Objective:      Visit Vitals    /60 (BP 1 Location: Right arm, BP Patient Position: Post activity)    Pulse 81    Temp 98.2 °F (36.8 °C)    Resp 18    Ht 5' 7.5\" (1.715 m)    Wt 71.3 kg (157 lb 4 oz)    SpO2 98%    BMI 24.27 kg/m2       Physical Exam:   Dressing:  clean, dry, intact  Wiggles Toes/Ankle  Foot sensation intact to light touch  No foot edema/ +1 Posterior Tibial Pulse  Calf soft, supple and non tender. Negative Homans sign. Intake and Output:  Current Shift:  09/13 1901 - 09/14 0700  In: 18 [P.O.:710; I.V.:199]  Out: 65 [Urine:1550]  Last three shifts:  09/12 0701 - 09/13 1900  In: 3000 [I.V.:3000]  Out: 425 [Urine:400]  Voiding Status:  + void without need for Nascimento catheter    Lab/Data Reviewed:  Recent Labs      09/14/17   0505   HGB  9.9*   HCT  28.7*   NA  134*   K  3.5   BUN  9   CREA  0.51*   GLU  122*         Medications Reviewed    Assessment/Plan     Principal Problem:    Primary osteoarthritis of right knee (9/8/2017)    Active Problems:    GERD (gastroesophageal reflux disease) (9/8/2017)      HTN (hypertension) (9/8/2017)      Acquired hypothyroidism (9/8/2017)      High cholesterol (9/8/2017)      Right knee DJD (9/13/2017)        · Change IV to Saline Lock. · Discharge Planning for home. · Begin DVT Prophylaxis - Aspirin 325 mg twice daily  · Continue PT WBAT, ROM as tolerated. Continue with aggressive ROM exercises hourly using the physical therapy exercises sheet.   · Discharge home today.        The patient was seen and examined by Dr. Mike Grider today as well and he is in agreement with above.

## 2017-09-14 NOTE — ROUTINE PROCESS
Bedside and Verbal shift change report given to CAROLINE Ernst RN (oncoming nurse) by DOROTHY Johnson RN (offgoing nurse). Report included the following information SBAR, OR Summary, Procedure Summary, Intake/Output and MAR.

## 2017-09-14 NOTE — ROUTINE PROCESS
Bedside shift change report given to Mary Galvez RN (oncoming nurse) by Ismael Jackson RN (offgoing nurse). Report included the following information SBAR, Kardex, Procedure Summary, Intake/Output, MAR, Recent Results and Med Rec Status.

## 2017-09-14 NOTE — PROGRESS NOTES
Chart reviewed, met with pt and spouse at bedside. Pt plans discharge home this afternoon after she clears PT. FOC offered and pt chose [facility] [phone] for follow up; referral placed with CMS. Pt has RW for home. Care Management Interventions  PCP Verified by CM:  Yes  Transition of Care Consult (CM Consult): [address]: No  Reason Outside [place]: Physician referred to specific agency [facility])  Discharge Durable Medical Equipment: No  Physical Therapy Consult: Yes  Occupational Therapy Consult: Yes  Current Support Network: Lives with Spouse, Own Home  Confirm Follow Up Transport: Family  Plan discussed with Pt/Family/Caregiver: Yes  Freedom of Choice Offered: Yes  Discharge Location  Discharge Placement: Home with home health

## 2017-09-14 NOTE — DISCHARGE INSTRUCTIONS
Joycelyn Alfredo III, MD Eliane Pass, PA-C    Lower Extremity Surgery  Discharge Instructions      Please take the time to review the following instructions before you leave the hospital and use them as guidelines during your recovery from surgery. If you have any questions you may contact my office at [phone]. Wound Care/Dressing Changes:    [x]   You may change your dressing as needed. Beginning the 2 days after you are discharged from the hospital you can change your dressing daily. A big, bulky dressing isn't necessary as long as there isn't any drainage from the incisions. You will be shown how to change the dressings properly by the home health aids as well. There will be a piece of tape over your incision that resembles gauze. This tape should stay on and you should not attempt to remove it. Once you begin to get this wet in the shower this will begin to fall off on its own, although it will take days. Do not apply antibiotic ointment to your incisions. Showering/Bathing:    [x]   You may shower 2 days after surgery. Your dressing may be removed for showering. You may get your incisions wet in the shower. Don't vigorously scrub the area where your incisions are. Please pat dry the incision. Apply a clean, dry dressing after drying off the area of your incisions. Don't take a tub bath, get in a swimming pool or Jacuzzi until the incisions are completely healed, and you are seen in the office by Dr. Jeanette Alexander. Do not soak your incisions under water. []   Do not get the dressing wet. Once you remove it two days from surgery, you may get the incision wet if there is no drainage. Weight Bearing Status/Braces/Activity:    [x]   If you have had a total knee replacement you may weight bear as tolerated with the knee immobilizer in place. Use crutches, cane, or walker as needed. You should sleep in your knee immobilizer.   You need to begin working on range of motion early after your surgery. It is very important to work on extension (straighthening) the knee. You will be advanced with walking and range of motion by physical therapy at home.     []   If you have had a total hip replacement you may weight bear as tolerated. Physical therapy with come by your house to help you perform exercises and work on strength and range of motion. Ice/Elevation:    Continue ice and elevation consistently for 48 hours after surgery. If you have had a total knee replacement when elevating your knee elevate it on about 4 pillows to be sure it is above your heart. After 48 hours, you should ice your knee 3 times per day, for 20 minutes at a time for the next 5 days. After one week from surgery, you may use ice and elevation as needed for pain and swelling. Diet:    You may advance to your regular diet as tolerated. Medication:    1. You will be given a prescription for pain medications when you are discharged from the hospital.  Take the medication as needed according to the directions on the prescription bottle. Possible side effects of the medication include dizziness, headache, nausea, vomiting, constipation and urinary retention. If you experience any of these side effects call the office so that we can assist you in relieving them. Discontinue the use of the pain medication if you develop itching, rash, shortness of breath or difficulties swallowing. If these symptoms become severe or aren't relieved by discontinuing the medication you should seek immediate medical attention. Refills of pain medication are authorized during office hours only (8AM - 5PM Mon thru Fri). 2. If you were prescribed Percocet/oxycodone or Dilaudid/hydromorphone you must have a written prescription. These medications legally cannot be called in to a pharmacy. 3. Do not take Tylenol in addition to your pain medication as most of the pain medication already contains Tylenol.   Exceptions include Dilaudid/hydromorphone, Demerol/meperidine and roxicodone. Do not exceed 3000 mg of Tylenol per day. Ex:  (hydrocodone 5/325mg = 325 mg of Tylenol)  4. You should use Aspirin 325 mg twice daily for 21 days from the date of your surgery. This will help to prevent blood clots from forming in your legs. This needs to be started the day after your surgery and home healthcare will teach you how this is done. If you are taking another medication such as Xarelto as discussed with Dr. Penelope Elizondo this should also be started the day after the surgery. 5. You may resume the medications you were taking prior to your surgery, unless otherwise specified in your discharge instructions. Pain medication may change the effects of any antidepressant medication. If you have any questions about possible interactions between your regular medications and the pain medication you should consult the physician who prescribes your regular medications. 6. If you had a total joint as an outpatient procedure and did not spend the night in the hospital, Dr. Penelope Elizondo has written for an oral antibiotic that you should take as instructed. This antibiotic is generally Keflex or Clindamycin. If you spent the night in the hospital the antibiotic was given to you through the IV and there is nothing else to take orally. Follow Up Appointment:    If you are unsure of your follow-up appointment date and time, please call [phone]. Please let our  know you are scheduling a post-op appointment. Most appointments should be between 7-14 days after your surgery. Physical Therapy:    [x]   Physical therapy will be discussed with you at your first follow-up appointment with Dr. Penelope Elizondo. You don't need to begin physical therapy prior to that visit. You are to participate with [address] [place] [address] as arranged pre-operatively in the convience of your own home.     Important signs and symptoms:    If any of the following signs and symptoms occurs, you should contact Dr. Claude Klein' office. Please be advised if a problem arises which you feel required immediate medical attention or your are unable to contact Dr. Claude Klein' office you should seek immediate medical attention. Signs and symptoms to watch for include:  1. A sudden increase in swelling and/or redness or warmth at the area your surgery was performed which isn't relieved by rest, ice and elevation. 2. Oral temperature greater than 101.5 degrees for 12 hours or more which isn't relieved by an increase in fluid intake and taking two Tylenol every 4-6 hours. Do not exceed 3000 mg of Tylenol per day. 3. Excessive drainage from your incisions or drainage that hasn't stopped by 72 hours. 4. Calf pian, tenderness, redness or swelling which isn't relieved with rest and elevation. 5. Fever, chills, shortness of breath, chest pain, nausea, vomiting or other signs and symptoms which are of concern to you.

## 2017-09-14 NOTE — PROGRESS NOTES
Problem: Falls - Risk of  Goal: *Absence of Falls  Document Tom Fall Risk and appropriate interventions in the flowsheet.    Outcome: Progressing Towards Goal  Fall Risk Interventions:  Mobility Interventions: Patient to call before getting OOB     Mentation Interventions: Adequate sleep, hydration, pain control     Medication Interventions: Patient to call before getting OOB, Teach patient to arise slowly     Elimination Interventions: Call light in reach, Patient to call for help with toileting needs, Toileting schedule/hourly rounds                 Comments:   Pt progressing towards goals

## (undated) DEVICE — INTENDED FOR TISSUE SEPARATION, AND OTHER PROCEDURES THAT REQUIRE A SHARP SURGICAL BLADE TO PUNCTURE OR CUT.: Brand: BARD-PARKER ® CARBON RIB-BACK BLADES

## (undated) DEVICE — (D)PREP SKN CHLRAPRP APPL 26ML -- CONVERT TO ITEM 371833

## (undated) DEVICE — PIN GUIDE FIX 3.2X62 MM SCREW [GS903A0620322P] [KOMET MEDICAL]

## (undated) DEVICE — 5065 IMPAD REGULAR PAIR: Brand: A-V IMPULSE

## (undated) DEVICE — NDL PRT INJ NSAF BLNT 18GX1.5 --

## (undated) DEVICE — SPLINT KNEE UNIV FOR LESS THAN 36IN L20IN FOAM LAM E CNTCT

## (undated) DEVICE — PACK PROCEDURE SURG TOT KNEE CUST

## (undated) DEVICE — REM POLYHESIVE ADULT PATIENT RETURN ELECTRODE: Brand: VALLEYLAB

## (undated) DEVICE — STERILE POLYISOPRENE POWDER-FREE SURGICAL GLOVES WITH EMOLLIENT COATING: Brand: PROTEXIS

## (undated) DEVICE — DISPOSABLE TOURNIQUET CUFF SINGLE BLADDER, SINGLE PORT AND QUICK CONNECT CONNECTOR: Brand: COLOR CUFF

## (undated) DEVICE — IMMOBILIZER KNEE UNIV L19IN FOR 12-24IN THGH FOAM T BAR

## (undated) DEVICE — STERILE TETRA-FLEX CF LF, 6IN X 11 YD: Brand: TETRA-FLEX™ CF

## (undated) DEVICE — STERILE POLYISOPRENE POWDER-FREE SURGICAL GLOVES: Brand: PROTEXIS

## (undated) DEVICE — SUT VCRL + 2-0 36IN CT1 UD --

## (undated) DEVICE — SOLUTION IV 250ML 0.9% SOD CHL CLR INJ FLX BG CONT PRT CLSR

## (undated) DEVICE — SOL INJ L R 1000ML BG --

## (undated) DEVICE — GOWN,SIRUS,NONRNF,SETINSLV,XL,20/CS: Brand: MEDLINE

## (undated) DEVICE — PLUS HANDPIECE WITH SPRAY TIP: Brand: SURGILAV

## (undated) DEVICE — BASIC SINGLE BASIN 1-LF: Brand: MEDLINE INDUSTRIES, INC.

## (undated) DEVICE — BLADE SAW 1.19X20X90 MM FOR LG BNE

## (undated) DEVICE — LIGHT HANDLE: Brand: DEVON

## (undated) DEVICE — 3M™ COBAN™ NL STERILE NON-LATEX SELF-ADHERENT WRAP, 2086S, 6 IN X 5 YD (15 CM X 4,5 M), 12 ROLLS/CASE: Brand: 3M™ COBAN™

## (undated) DEVICE — SYRINGE MED 20ML STD CLR PLAS LUERLOCK TIP N CTRL DISP

## (undated) DEVICE — Device

## (undated) DEVICE — DEVON™ KNEE AND BODY STRAP 60" X 3" (1.5 M X 7.6 CM): Brand: DEVON

## (undated) DEVICE — SINGLE PORT MANIFOLD: Brand: NEPTUNE 2

## (undated) DEVICE — PIN GUIDE FIX 3.2X62 MM SCREW [GS9030620324P] [KOMET MEDICAL]

## (undated) DEVICE — SYSTEM SKIN CLSR 22CM DERMBND PRINEO

## (undated) DEVICE — (D)SYR 10ML 1/5ML GRAD NSAF -- PKGING CHANGE USE ITEM 338027

## (undated) DEVICE — BLADE SAW W13XL90MM 1.19MM PARA

## (undated) DEVICE — NDL SPNE QNCKE 18GX3.5IN LF --

## (undated) DEVICE — SOL IRRIGATION INJ NACL 0.9% 500ML BTL

## (undated) DEVICE — NEEDLE HYPO 22GA L1.5IN BLK S STL HUB POLYPR SHLD REG BVL

## (undated) DEVICE — TURNOVER KIT OR F/MIH 1450

## (undated) DEVICE — SUTURE STRATAFIX SYMMETRIC PDS + 1 SGL ARMED CT 18 IN LEN SXPP1A405

## (undated) DEVICE — 3M™ STERI-DRAPE™ U-DRAPE 1015: Brand: STERI-DRAPE™

## (undated) DEVICE — SOL IRR STRL H2O 1500ML BTL --

## (undated) DEVICE — DRSG FOAM MEPILX PST OP 4X12IN --